# Patient Record
Sex: FEMALE | Race: WHITE | Employment: OTHER | URBAN - METROPOLITAN AREA
[De-identification: names, ages, dates, MRNs, and addresses within clinical notes are randomized per-mention and may not be internally consistent; named-entity substitution may affect disease eponyms.]

---

## 2017-03-30 ENCOUNTER — HOSPITAL ENCOUNTER (OUTPATIENT)
Dept: LAB | Age: 82
Discharge: HOME | End: 2017-03-30
Payer: COMMERCIAL

## 2017-03-30 LAB
HCT VFR BLD CALC: 35.7 % (ref 37–47)
HEMOGLOBIN: 11.7 G/DL (ref 12–16)
MCH RBC QN AUTO: 27 PG (ref 27–31.3)
MCHC RBC AUTO-ENTMCNC: 32.9 % (ref 33–37)
MCV RBC AUTO: 82.1 FL (ref 82–100)
PDW BLD-RTO: 16.4 % (ref 11.5–14.5)
PLATELET # BLD: 362 K/UL (ref 130–400)
RBC # BLD: 4.34 M/UL (ref 4.2–5.4)
TSH SERPL DL<=0.05 MIU/L-ACNC: 3.5 UIU/ML (ref 0.27–4.2)
WBC # BLD: 7.3 K/UL (ref 4.8–10.8)

## 2017-03-30 PROCEDURE — 36415 COLL VENOUS BLD VENIPUNCTURE: CPT

## 2017-03-30 PROCEDURE — 84443 ASSAY THYROID STIM HORMONE: CPT

## 2017-03-30 PROCEDURE — 87086 URINE CULTURE/COLONY COUNT: CPT

## 2017-03-30 PROCEDURE — 85027 COMPLETE CBC AUTOMATED: CPT

## 2017-04-01 LAB
ORGANISM: ABNORMAL
URINE CULTURE, ROUTINE: ABNORMAL

## 2017-05-02 ENCOUNTER — HOSPITAL ENCOUNTER (OUTPATIENT)
Age: 82
Setting detail: SPECIMEN
Discharge: HOME | End: 2017-05-02
Payer: COMMERCIAL

## 2017-05-02 PROCEDURE — 87077 CULTURE AEROBIC IDENTIFY: CPT

## 2017-05-02 PROCEDURE — 87086 URINE CULTURE/COLONY COUNT: CPT

## 2017-05-02 PROCEDURE — 87186 SC STD MICRODIL/AGAR DIL: CPT

## 2017-05-04 LAB
ORGANISM: ABNORMAL
URINE CULTURE, ROUTINE: ABNORMAL

## 2017-06-12 ENCOUNTER — HOSPITAL ENCOUNTER (EMERGENCY)
Age: 82
Discharge: HOME | End: 2017-06-12
Attending: EMERGENCY MEDICINE
Payer: COMMERCIAL

## 2017-06-12 VITALS
TEMPERATURE: 98.2 F | HEART RATE: 80 BPM | SYSTOLIC BLOOD PRESSURE: 151 MMHG | BODY MASS INDEX: 32.25 KG/M2 | DIASTOLIC BLOOD PRESSURE: 74 MMHG | WEIGHT: 160 LBS | OXYGEN SATURATION: 97 % | HEIGHT: 59 IN | RESPIRATION RATE: 12 BRPM

## 2017-06-12 DIAGNOSIS — R55 VASOVAGAL SYNCOPE: ICD-10-CM

## 2017-06-12 DIAGNOSIS — E86.0 DEHYDRATION, MODERATE: ICD-10-CM

## 2017-06-12 DIAGNOSIS — N39.0 ACUTE URINARY TRACT INFECTION: Primary | ICD-10-CM

## 2017-06-12 LAB
ANION GAP SERPL CALCULATED.3IONS-SCNC: 17 MEQ/L (ref 7–13)
BACTERIA: ABNORMAL /HPF
BILIRUBIN URINE: NEGATIVE
BLOOD, URINE: NORMAL
BUN BLDV-MCNC: 15 MG/DL (ref 8–23)
CALCIUM SERPL-MCNC: 10.7 MG/DL (ref 8.6–10.2)
CHLORIDE BLD-SCNC: 98 MEQ/L (ref 98–107)
CLARITY: CLEAR
CO2: 27 MEQ/L (ref 22–29)
COLOR: YELLOW
CREAT SERPL-MCNC: 0.65 MG/DL (ref 0.5–0.9)
GFR AFRICAN AMERICAN: >60
GFR NON-AFRICAN AMERICAN: >60
GLUCOSE BLD-MCNC: 152 MG/DL (ref 74–109)
GLUCOSE URINE: NEGATIVE MG/DL
HCT VFR BLD CALC: 35.6 % (ref 37–47)
HEMOGLOBIN: 11.8 G/DL (ref 12–16)
KETONES, URINE: NEGATIVE MG/DL
LEUKOCYTE ESTERASE, URINE: NORMAL
MCH RBC QN AUTO: 27.5 PG (ref 27–31.3)
MCHC RBC AUTO-ENTMCNC: 33.3 % (ref 33–37)
MCV RBC AUTO: 82.8 FL (ref 82–100)
NITRITE, URINE: NEGATIVE
PDW BLD-RTO: 17.1 % (ref 11.5–14.5)
PH UA: 7 (ref 5–9)
PLATELET # BLD: 360 K/UL (ref 130–400)
POTASSIUM SERPL-SCNC: 4.3 MEQ/L (ref 3.5–5.1)
PROTEIN UA: NEGATIVE MG/DL
RBC # BLD: 4.3 M/UL (ref 4.2–5.4)
RBC UA: ABNORMAL /HPF (ref 0–2)
SODIUM BLD-SCNC: 142 MEQ/L (ref 132–144)
SPECIFIC GRAVITY UA: 1.01 (ref 1–1.03)
TROPONIN: <0.01 NG/ML (ref 0–0.01)
URINE REFLEX TO CULTURE: YES
UROBILINOGEN, URINE: 0.2 E.U./DL
WBC # BLD: 8.3 K/UL (ref 4.8–10.8)
WBC UA: ABNORMAL /HPF (ref 0–5)

## 2017-06-12 PROCEDURE — 81001 URINALYSIS AUTO W/SCOPE: CPT

## 2017-06-12 PROCEDURE — 85027 COMPLETE CBC AUTOMATED: CPT

## 2017-06-12 PROCEDURE — 80048 BASIC METABOLIC PNL TOTAL CA: CPT

## 2017-06-12 PROCEDURE — 87186 SC STD MICRODIL/AGAR DIL: CPT

## 2017-06-12 PROCEDURE — 93005 ELECTROCARDIOGRAM TRACING: CPT

## 2017-06-12 PROCEDURE — 87086 URINE CULTURE/COLONY COUNT: CPT

## 2017-06-12 PROCEDURE — 99284 EMERGENCY DEPT VISIT MOD MDM: CPT

## 2017-06-12 PROCEDURE — 87077 CULTURE AEROBIC IDENTIFY: CPT

## 2017-06-12 PROCEDURE — 6360000002 HC RX W HCPCS: Performed by: EMERGENCY MEDICINE

## 2017-06-12 PROCEDURE — 96361 HYDRATE IV INFUSION ADD-ON: CPT

## 2017-06-12 PROCEDURE — 84484 ASSAY OF TROPONIN QUANT: CPT

## 2017-06-12 PROCEDURE — 2580000003 HC RX 258: Performed by: EMERGENCY MEDICINE

## 2017-06-12 PROCEDURE — 96374 THER/PROPH/DIAG INJ IV PUSH: CPT

## 2017-06-12 PROCEDURE — 36415 COLL VENOUS BLD VENIPUNCTURE: CPT

## 2017-06-12 RX ORDER — ONDANSETRON 2 MG/ML
4 INJECTION INTRAMUSCULAR; INTRAVENOUS ONCE
Status: COMPLETED | OUTPATIENT
Start: 2017-06-12 | End: 2017-06-12

## 2017-06-12 RX ORDER — ONDANSETRON 4 MG/1
4 TABLET, FILM COATED ORAL EVERY 8 HOURS PRN
Qty: 20 TABLET | Refills: 0 | Status: SHIPPED | OUTPATIENT
Start: 2017-06-12 | End: 2018-07-24 | Stop reason: ALTCHOICE

## 2017-06-12 RX ORDER — 0.9 % SODIUM CHLORIDE 0.9 %
500 INTRAVENOUS SOLUTION INTRAVENOUS ONCE
Status: COMPLETED | OUTPATIENT
Start: 2017-06-12 | End: 2017-06-12

## 2017-06-12 RX ORDER — SODIUM CHLORIDE 9 MG/ML
INJECTION, SOLUTION INTRAVENOUS CONTINUOUS
Status: DISCONTINUED | OUTPATIENT
Start: 2017-06-12 | End: 2017-06-12 | Stop reason: HOSPADM

## 2017-06-12 RX ORDER — CEPHALEXIN 500 MG/1
500 CAPSULE ORAL 4 TIMES DAILY
Qty: 28 CAPSULE | Refills: 0 | Status: SHIPPED | OUTPATIENT
Start: 2017-06-12 | End: 2018-07-24 | Stop reason: ALTCHOICE

## 2017-06-12 RX ADMIN — SODIUM CHLORIDE 500 ML: 900 INJECTION, SOLUTION INTRAVENOUS at 12:44

## 2017-06-12 RX ADMIN — SODIUM CHLORIDE: 900 INJECTION, SOLUTION INTRAVENOUS at 13:12

## 2017-06-12 RX ADMIN — ONDANSETRON 4 MG: 2 INJECTION INTRAMUSCULAR; INTRAVENOUS at 12:44

## 2017-06-12 ASSESSMENT — ENCOUNTER SYMPTOMS
RHINORRHEA: 0
EYE PAIN: 0
WHEEZING: 0
SORE THROAT: 0
DIARRHEA: 0
EYE DISCHARGE: 0
COLOR CHANGE: 0
ABDOMINAL PAIN: 0
CONSTIPATION: 0
COUGH: 0
VOICE CHANGE: 0
SHORTNESS OF BREATH: 0
VOMITING: 0
RECTAL PAIN: 0
ANAL BLEEDING: 0
APNEA: 0
SINUS PRESSURE: 0
EYE ITCHING: 0
STRIDOR: 0
CHOKING: 0
CHEST TIGHTNESS: 0
PHOTOPHOBIA: 0
TROUBLE SWALLOWING: 0
ABDOMINAL DISTENTION: 0
BACK PAIN: 0
NAUSEA: 0
BLOOD IN STOOL: 0
FACIAL SWELLING: 0
EYE REDNESS: 0

## 2017-06-14 LAB
ORGANISM: ABNORMAL
URINE CULTURE, ROUTINE: ABNORMAL

## 2017-06-22 LAB
EKG ATRIAL RATE: 80 BPM
EKG P AXIS: 21 DEGREES
EKG P-R INTERVAL: 168 MS
EKG Q-T INTERVAL: 384 MS
EKG QRS DURATION: 80 MS
EKG QTC CALCULATION (BAZETT): 442 MS
EKG R AXIS: -16 DEGREES
EKG T AXIS: 16 DEGREES
EKG VENTRICULAR RATE: 80 BPM

## 2017-07-14 ASSESSMENT — ENCOUNTER SYMPTOMS
SORE THROAT: 0
COUGH: 0
WHEEZING: 0
VOMITING: 0
EYE PAIN: 0
NAUSEA: 0
CHOKING: 0
EYE ITCHING: 0
EYE DISCHARGE: 0
APNEA: 0
RHINORRHEA: 0
COLOR CHANGE: 0
STRIDOR: 0
BLOOD IN STOOL: 0
SINUS PRESSURE: 0
ABDOMINAL DISTENTION: 0
SHORTNESS OF BREATH: 0
BACK PAIN: 0
CHEST TIGHTNESS: 0
ABDOMINAL PAIN: 0
EYE REDNESS: 0
PHOTOPHOBIA: 0
FACIAL SWELLING: 0
RECTAL PAIN: 0
TROUBLE SWALLOWING: 0
DIARRHEA: 0
ANAL BLEEDING: 0
VOICE CHANGE: 0
CONSTIPATION: 0

## 2017-11-20 ENCOUNTER — TELEPHONE (OUTPATIENT)
Dept: PHARMACY | Facility: CLINIC | Age: 82
End: 2017-11-20

## 2017-11-20 NOTE — TELEPHONE ENCOUNTER
CLINICAL PHARMACY: [facility] REVIEW    Identified care gap 90 day conversion per [employer]: Pravastatin 20mg    Reached patient to review. Patient is interested in changing to a 90-day supply at this time. Patient stated she was on her way to her doctors appointment and will let her doctor know to switch her medications to 90 day supply    Nancy SStephanie  [address]  Direct: [phone]  Department, toll free: [phone], option 7

## 2018-06-18 ENCOUNTER — HOSPITAL ENCOUNTER (OUTPATIENT)
Dept: LAB | Age: 83
Discharge: HOME | End: 2018-06-18
Payer: MEDICARE

## 2018-06-18 LAB
ANION GAP SERPL CALCULATED.3IONS-SCNC: 15 MEQ/L (ref 7–13)
BUN BLDV-MCNC: 26 MG/DL (ref 8–23)
CALCIUM SERPL-MCNC: 9 MG/DL (ref 8.6–10.2)
CHLORIDE BLD-SCNC: 101 MEQ/L (ref 98–107)
CO2: 26 MEQ/L (ref 22–29)
CREAT SERPL-MCNC: 0.72 MG/DL (ref 0.5–0.9)
GFR AFRICAN AMERICAN: >60
GFR NON-AFRICAN AMERICAN: >60
GLUCOSE BLD-MCNC: 106 MG/DL (ref 74–109)
POTASSIUM SERPL-SCNC: 3.8 MEQ/L (ref 3.5–5.1)
SODIUM BLD-SCNC: 142 MEQ/L (ref 132–144)

## 2018-06-18 PROCEDURE — 36415 COLL VENOUS BLD VENIPUNCTURE: CPT

## 2018-06-18 PROCEDURE — 80048 BASIC METABOLIC PNL TOTAL CA: CPT

## 2018-10-01 ENCOUNTER — HOSPITAL ENCOUNTER (OUTPATIENT)
Dept: LAB | Age: 83
Discharge: HOME | End: 2018-10-01
Payer: MEDICARE

## 2018-10-01 LAB
FERRITIN: 18.1 NG/ML (ref 13–150)
HCT VFR BLD CALC: 33.2 % (ref 37–47)
HEMOGLOBIN: 11 G/DL (ref 12–16)
IRON SATURATION: 10 % (ref 11–46)
IRON: 40 UG/DL (ref 37–145)
MCH RBC QN AUTO: 28.3 PG (ref 27–31.3)
MCHC RBC AUTO-ENTMCNC: 33.2 % (ref 33–37)
MCV RBC AUTO: 85.1 FL (ref 82–100)
PDW BLD-RTO: 16.6 % (ref 11.5–14.5)
PLATELET # BLD: 356 K/UL (ref 130–400)
RBC # BLD: 3.9 M/UL (ref 4.2–5.4)
TOTAL IRON BINDING CAPACITY: 385 UG/DL (ref 178–450)
VITAMIN B-12: 690 PG/ML (ref 232–1245)
WBC # BLD: 7.1 K/UL (ref 4.8–10.8)

## 2018-10-01 PROCEDURE — 83540 ASSAY OF IRON: CPT

## 2018-10-01 PROCEDURE — 85027 COMPLETE CBC AUTOMATED: CPT

## 2018-10-01 PROCEDURE — 82728 ASSAY OF FERRITIN: CPT

## 2018-10-01 PROCEDURE — 36415 COLL VENOUS BLD VENIPUNCTURE: CPT

## 2018-10-01 PROCEDURE — 83550 IRON BINDING TEST: CPT

## 2018-10-01 PROCEDURE — 82607 VITAMIN B-12: CPT

## 2019-05-15 ENCOUNTER — HOSPITAL ENCOUNTER (OUTPATIENT)
Dept: LAB | Age: 84
Discharge: HOME | End: 2019-05-15
Payer: MEDICARE

## 2019-05-15 LAB
ANION GAP SERPL CALCULATED.3IONS-SCNC: 16 MEQ/L (ref 9–15)
BUN BLDV-MCNC: 24 MG/DL (ref 8–23)
CALCIUM SERPL-MCNC: 10 MG/DL (ref 8.5–9.9)
CHLORIDE BLD-SCNC: 98 MEQ/L (ref 95–107)
CHOLESTEROL, TOTAL: 213 MG/DL (ref 0–199)
CO2: 25 MEQ/L (ref 20–31)
CREAT SERPL-MCNC: 0.74 MG/DL (ref 0.5–0.9)
GFR AFRICAN AMERICAN: >60
GFR NON-AFRICAN AMERICAN: >60
GLUCOSE BLD-MCNC: 128 MG/DL (ref 70–99)
HBA1C MFR BLD: 7.3 % (ref 4.8–5.9)
HDLC SERPL-MCNC: 57 MG/DL (ref 40–59)
LDL CHOLESTEROL CALCULATED: 112 MG/DL (ref 0–129)
POTASSIUM SERPL-SCNC: 4.1 MEQ/L (ref 3.4–4.9)
SODIUM BLD-SCNC: 139 MEQ/L (ref 135–144)
TRIGL SERPL-MCNC: 222 MG/DL (ref 0–150)

## 2019-05-15 PROCEDURE — 80048 BASIC METABOLIC PNL TOTAL CA: CPT

## 2019-05-15 PROCEDURE — 36415 COLL VENOUS BLD VENIPUNCTURE: CPT

## 2019-05-15 PROCEDURE — 80061 LIPID PANEL: CPT

## 2019-05-15 PROCEDURE — 83036 HEMOGLOBIN GLYCOSYLATED A1C: CPT

## 2019-06-04 ENCOUNTER — HOSPITAL ENCOUNTER (OUTPATIENT)
Age: 84
Setting detail: SPECIMEN
Discharge: HOME | End: 2019-06-04
Payer: MEDICARE

## 2019-06-04 PROCEDURE — 87186 SC STD MICRODIL/AGAR DIL: CPT

## 2019-06-04 PROCEDURE — 87086 URINE CULTURE/COLONY COUNT: CPT

## 2019-06-04 PROCEDURE — 87077 CULTURE AEROBIC IDENTIFY: CPT

## 2019-06-07 LAB
ORGANISM: ABNORMAL
URINE CULTURE, ROUTINE: ABNORMAL
URINE CULTURE, ROUTINE: ABNORMAL

## 2019-06-21 ENCOUNTER — HOSPITAL ENCOUNTER (OUTPATIENT)
Age: 84
Setting detail: SPECIMEN
Discharge: HOME | End: 2019-06-21
Payer: MEDICARE

## 2019-06-21 PROCEDURE — 87086 URINE CULTURE/COLONY COUNT: CPT

## 2019-06-23 LAB — URINE CULTURE, ROUTINE: NORMAL

## 2020-07-07 ENCOUNTER — OFFICE VISIT (OUTPATIENT)
Dept: OBGYN CLINIC | Age: 85
End: 2020-07-07
Payer: MEDICARE

## 2020-07-07 VITALS
SYSTOLIC BLOOD PRESSURE: 124 MMHG | BODY MASS INDEX: 30 KG/M2 | DIASTOLIC BLOOD PRESSURE: 80 MMHG | HEIGHT: 59 IN | WEIGHT: 148.8 LBS

## 2020-07-07 PROCEDURE — G8400 PT W/DXA NO RESULTS DOC: HCPCS | Performed by: OBSTETRICS & GYNECOLOGY

## 2020-07-07 PROCEDURE — G8427 DOCREV CUR MEDS BY ELIG CLIN: HCPCS | Performed by: OBSTETRICS & GYNECOLOGY

## 2020-07-07 PROCEDURE — 1036F TOBACCO NON-USER: CPT | Performed by: OBSTETRICS & GYNECOLOGY

## 2020-07-07 PROCEDURE — 1123F ACP DISCUSS/DSCN MKR DOCD: CPT | Performed by: OBSTETRICS & GYNECOLOGY

## 2020-07-07 PROCEDURE — 1090F PRES/ABSN URINE INCON ASSESS: CPT | Performed by: OBSTETRICS & GYNECOLOGY

## 2020-07-07 PROCEDURE — 99203 OFFICE O/P NEW LOW 30 MIN: CPT | Performed by: OBSTETRICS & GYNECOLOGY

## 2020-07-07 PROCEDURE — G8417 CALC BMI ABV UP PARAM F/U: HCPCS | Performed by: OBSTETRICS & GYNECOLOGY

## 2020-07-07 PROCEDURE — 4040F PNEUMOC VAC/ADMIN/RCVD: CPT | Performed by: OBSTETRICS & GYNECOLOGY

## 2020-07-22 ENCOUNTER — OFFICE VISIT (OUTPATIENT)
Dept: OBGYN CLINIC | Age: 85
End: 2020-07-22
Payer: MEDICARE

## 2020-07-22 VITALS
DIASTOLIC BLOOD PRESSURE: 76 MMHG | HEART RATE: 80 BPM | WEIGHT: 147 LBS | BODY MASS INDEX: 29.64 KG/M2 | HEIGHT: 59 IN | SYSTOLIC BLOOD PRESSURE: 138 MMHG

## 2020-07-22 PROCEDURE — G8417 CALC BMI ABV UP PARAM F/U: HCPCS | Performed by: OBSTETRICS & GYNECOLOGY

## 2020-07-22 PROCEDURE — 1090F PRES/ABSN URINE INCON ASSESS: CPT | Performed by: OBSTETRICS & GYNECOLOGY

## 2020-07-22 PROCEDURE — 4040F PNEUMOC VAC/ADMIN/RCVD: CPT | Performed by: OBSTETRICS & GYNECOLOGY

## 2020-07-22 PROCEDURE — 1123F ACP DISCUSS/DSCN MKR DOCD: CPT | Performed by: OBSTETRICS & GYNECOLOGY

## 2020-07-22 PROCEDURE — 1036F TOBACCO NON-USER: CPT | Performed by: OBSTETRICS & GYNECOLOGY

## 2020-07-22 PROCEDURE — G8400 PT W/DXA NO RESULTS DOC: HCPCS | Performed by: OBSTETRICS & GYNECOLOGY

## 2020-07-22 PROCEDURE — G8427 DOCREV CUR MEDS BY ELIG CLIN: HCPCS | Performed by: OBSTETRICS & GYNECOLOGY

## 2020-07-22 PROCEDURE — 99213 OFFICE O/P EST LOW 20 MIN: CPT | Performed by: OBSTETRICS & GYNECOLOGY

## 2020-07-22 ASSESSMENT — ENCOUNTER SYMPTOMS
SHORTNESS OF BREATH: 0
ABDOMINAL PAIN: 0
APNEA: 0
ABDOMINAL PAIN: 0
SHORTNESS OF BREATH: 0
APNEA: 0

## 2020-07-22 NOTE — PROGRESS NOTES
Subjective:      Patient ID:  Calvin Trejo is a 82 y.o. female with chief complaint of:  Chief Complaint   Patient presents with    Incontinence     pt referred by Dr. Narciso Hidalgo, pt has heavy urine leakage thast been going on for year. Patient with concern about frequent urination and urine loss in the last year. However with review of case she has had intermittent urinary tract infections that were culture positive.  No bleeding no pelvic pain no discharge      Past Medical History:   Diagnosis Date    [facility] [address]     Diabetes ([facility] [address]     GERD (gastroesophageal reflux disease)     Hyperlipidemia     Hypertension     Sleep difficulties      Past Surgical History:   Procedure Laterality Date    APPENDECTOMY      BREAST BIOPSY      TONSILLECTOMY AND ADENOIDECTOMY      WRIST SURGERY Right      Family History   Problem Relation Age of Onset    Cancer Mother         colon    Cancer Father         colon    Cancer Maternal Aunt         colon    Cancer Maternal Grandmother         colon     Current Outpatient Medications on File Prior to Visit   Medication Sig Dispense Refill    Mirabegron ER 50 MG TB24 Take 50 mg by mouth daily 30 tablet 1    ibuprofen (ADVIL;MOTRIN) 200 MG tablet Take 200 mg by mouth      MISC NATURAL PRODUCTS PO Take 10 mg by mouth       MISC NATURAL PRODUCTS PO Take 3 tablets by mouth       Melatonin 10 MG TABS Take 10 mg by mouth      Mirabegron ER (MYRBETRIQ) 50 MG TB24 Take 50 mg by mouth daily 21 tablet 0    nystatin (NYSTATIN) 983949 UNIT/GM powder APPLY 3 TIMES DAILY 1 Bottle 0    esomeprazole (NEXIUM) 40 MG capsule Take by mouth      Multiple Vitamins-Minerals (MULTIVITAMIN ADULT) TABS Take by mouth      traMADol (ULTRAM) 50 MG tablet Take 50 mg by mouth      amLODIPine-benazepril (LOTREL) 5-20 MG per capsule       bisoprolol (ZEBETA) 10 MG tablet       pravastatin (PRAVACHOL) 40 MG tablet       metFORMIN (GLUCOPHAGE) 500 MG tablet        No current

## 2020-07-22 NOTE — PROGRESS NOTES
Subjective:      Patient ID:  Charity Rivera is a 82 y.o. female with chief complaint of:  Chief Complaint   Patient presents with    Follow-up     2 week Myrbetriq. She states it worked much better the first couple of days but then it seemed to not work so well. Patient presents as follow up to starting the use of Myrbetriq.  patient states she is having some improvement  Kang wetting overnight but she still feels like she has occasional loss doing the day      Past Medical History:   Diagnosis Date    Diabetes ([address]     Diabetes ([address]     GERD (gastroesophageal reflux disease)     Hyperlipidemia     Hypertension     Sleep difficulties      Past Surgical History:   Procedure Laterality Date    APPENDECTOMY      BREAST BIOPSY      TONSILLECTOMY AND ADENOIDECTOMY      WRIST SURGERY Right      Family History   Problem Relation Age of Onset    Cancer Mother         colon    Cancer Father         colon    Cancer Maternal Aunt         colon    Cancer Maternal Grandmother         colon     Current Outpatient Medications on File Prior to Visit   Medication Sig Dispense Refill    mirabegron (MYRBETRIQ) 25 MG TB24 Take 1 tablet by mouth daily Lot [id number]  Exp 12/2021 21 tablet 0    Mirabegron ER 50 MG TB24 Take 50 mg by mouth daily 30 tablet 1    ibuprofen (ADVIL;MOTRIN) 200 MG tablet Take 200 mg by mouth      MISC NATURAL PRODUCTS PO Take 10 mg by mouth       MISC NATURAL PRODUCTS PO Take 3 tablets by mouth       Melatonin 10 MG TABS Take 10 mg by mouth      Mirabegron ER (MYRBETRIQ) 50 MG TB24 Take 50 mg by mouth daily 21 tablet 0    nystatin (NYSTATIN) [id number] UNIT/GM powder APPLY 3 TIMES DAILY 1 Bottle 0    esomeprazole (NEXIUM) 40 MG capsule Take by mouth      Multiple Vitamins-Minerals (MULTIVITAMIN ADULT) TABS Take by mouth      traMADol (ULTRAM) 50 MG tablet Take 50 mg by mouth      amLODIPine-benazepril (LOTREL) 5-20 MG per capsule       bisoprolol (ZEBETA) 10 MG tablet       pravastatin (PRAVACHOL) 40 MG tablet       metFORMIN (GLUCOPHAGE) 500 MG tablet        No current facility-administered medications on file prior to visit. Allergies:  Actos [pioglitazone] and Codeine    Review of Systems   Constitutional: Negative for fatigue and fever. Respiratory: Negative for apnea and shortness of breath. Cardiovascular: Negative for chest pain and palpitations. Gastrointestinal: Negative for abdominal pain. Genitourinary: Positive for difficulty urinating. Negative for dysuria, pelvic pain, vaginal bleeding and vaginal discharge. Neurological: Negative for dizziness, weakness and light-headedness. Psychiatric/Behavioral: Negative for agitation and dysphoric mood. Objective:   /76 (Site: Right Upper Arm, Position: Sitting, Cuff Size: Medium Adult)   Pulse 80   Ht 4' 11\" (1.499 m)   Wt 147 lb (66.7 kg)   BMI 29.69 kg/m²      Physical Exam  Constitutional:       Appearance: Normal appearance. Skin:     General: Skin is warm and dry. Neurological:      Mental Status: She is alert. Psychiatric:         Mood and Affect: Mood normal.         Behavior: Behavior normal.         Assessment:       Diagnosis Orders   1. OAB (overactive bladder)           Plan:      No orders of the defined types were placed in this encounter. No orders of the defined types were placed in this encounter. Return if symptoms worsen or fail to improve.      Fabian Kern,

## 2020-07-24 DIAGNOSIS — R35.0 URINARY FREQUENCY: ICD-10-CM

## 2020-07-24 DIAGNOSIS — N32.81 OAB (OVERACTIVE BLADDER): ICD-10-CM

## 2020-07-24 LAB
BILIRUBIN URINE: NEGATIVE
BLOOD, URINE: NEGATIVE
CLARITY: CLEAR
COLOR: YELLOW
GLUCOSE URINE: NEGATIVE MG/DL
KETONES, URINE: NEGATIVE MG/DL
LEUKOCYTE ESTERASE, URINE: ABNORMAL
NITRITE, URINE: POSITIVE
PH UA: 7.5 (ref 5–9)
PROTEIN UA: NEGATIVE MG/DL
SPECIFIC GRAVITY UA: 1.01 (ref 1–1.03)
UROBILINOGEN, URINE: 0.2 E.U./DL

## 2020-07-25 LAB
BACTERIA: ABNORMAL /HPF
EPITHELIAL CELLS, UA: ABNORMAL /HPF (ref 0–5)
HYALINE CASTS: ABNORMAL /HPF (ref 0–5)
RBC UA: ABNORMAL /HPF (ref 0–5)
WBC UA: ABNORMAL /HPF (ref 0–5)

## 2020-07-25 RX ORDER — CEPHALEXIN 500 MG/1
500 CAPSULE ORAL 2 TIMES DAILY
Qty: 14 CAPSULE | Refills: 0 | Status: SHIPPED | OUTPATIENT
Start: 2020-07-25 | End: 2020-08-01

## 2020-07-27 LAB
ORGANISM: ABNORMAL
URINE CULTURE, ROUTINE: ABNORMAL

## 2020-08-21 ENCOUNTER — TELEPHONE (OUTPATIENT)
Dept: OBGYN CLINIC | Age: 85
End: 2020-08-21

## 2020-08-21 NOTE — TELEPHONE ENCOUNTER
Pt calling stating that myrbetriq is no longer working, she is having heavy leakage and needs to know what else can be done.

## 2020-08-23 NOTE — TELEPHONE ENCOUNTER
If it is all of a sudden not working then you may have infection  Please have urine tested with us or pcp

## 2020-08-25 DIAGNOSIS — R35.0 URINARY FREQUENCY: ICD-10-CM

## 2020-08-25 LAB
BACTERIA: NEGATIVE /HPF
BILIRUBIN URINE: NEGATIVE
BLOOD, URINE: NEGATIVE
CLARITY: CLEAR
COLOR: YELLOW
EPITHELIAL CELLS, UA: NORMAL /HPF (ref 0–5)
GLUCOSE URINE: NEGATIVE MG/DL
HYALINE CASTS: NORMAL /HPF (ref 0–5)
KETONES, URINE: NEGATIVE MG/DL
LEUKOCYTE ESTERASE, URINE: ABNORMAL
NITRITE, URINE: POSITIVE
PH UA: 6.5 (ref 5–9)
PROTEIN UA: NEGATIVE MG/DL
RBC UA: NORMAL /HPF (ref 0–5)
SPECIFIC GRAVITY UA: 1.01 (ref 1–1.03)
UROBILINOGEN, URINE: 0.2 E.U./DL
WBC UA: NORMAL /HPF (ref 0–5)

## 2020-08-25 RX ORDER — CIPROFLOXACIN 500 MG/1
500 TABLET, FILM COATED ORAL 2 TIMES DAILY
Qty: 20 TABLET | Refills: 0 | Status: SHIPPED | OUTPATIENT
Start: 2020-08-25 | End: 2020-09-04

## 2020-08-28 LAB
ORGANISM: ABNORMAL
URINE CULTURE, ROUTINE: ABNORMAL

## 2020-09-08 ENCOUNTER — OFFICE VISIT (OUTPATIENT)
Dept: OBGYN CLINIC | Age: 85
End: 2020-09-08
Payer: MEDICARE

## 2020-09-08 VITALS
HEIGHT: 59 IN | DIASTOLIC BLOOD PRESSURE: 70 MMHG | BODY MASS INDEX: 29.6 KG/M2 | WEIGHT: 146.8 LBS | SYSTOLIC BLOOD PRESSURE: 144 MMHG

## 2020-09-08 PROCEDURE — 1090F PRES/ABSN URINE INCON ASSESS: CPT | Performed by: OBSTETRICS & GYNECOLOGY

## 2020-09-08 PROCEDURE — G8417 CALC BMI ABV UP PARAM F/U: HCPCS | Performed by: OBSTETRICS & GYNECOLOGY

## 2020-09-08 PROCEDURE — 99213 OFFICE O/P EST LOW 20 MIN: CPT | Performed by: OBSTETRICS & GYNECOLOGY

## 2020-09-08 PROCEDURE — G8400 PT W/DXA NO RESULTS DOC: HCPCS | Performed by: OBSTETRICS & GYNECOLOGY

## 2020-09-08 PROCEDURE — 1123F ACP DISCUSS/DSCN MKR DOCD: CPT | Performed by: OBSTETRICS & GYNECOLOGY

## 2020-09-08 PROCEDURE — 4040F PNEUMOC VAC/ADMIN/RCVD: CPT | Performed by: OBSTETRICS & GYNECOLOGY

## 2020-09-08 PROCEDURE — 1036F TOBACCO NON-USER: CPT | Performed by: OBSTETRICS & GYNECOLOGY

## 2020-09-08 PROCEDURE — G8427 DOCREV CUR MEDS BY ELIG CLIN: HCPCS | Performed by: OBSTETRICS & GYNECOLOGY

## 2020-09-08 ASSESSMENT — ENCOUNTER SYMPTOMS
SHORTNESS OF BREATH: 0
APNEA: 0

## 2020-09-08 NOTE — PROGRESS NOTES
Subjective:      Patient ID:  Harry Connell is a 82 y.o. female with chief complaint of:  Chief Complaint   Patient presents with    Urinary Frequency     pt here tod c/o UTI symptoms, urinary frequency, leakage, pt states that she had a UTI before but she feels it never went away       Patient presents today with concerns about possible UTI. Patient has problem with urge incontinence and overflow which initially treated with InterStim x2 and has failed since then. Patient was placed on Myrbetriq and she had some improvement but there was swelling noted with the use of medication so it was discontinued. Patient desires to move away from daily pad usage.       Past Medical History:   Diagnosis Date    [facility] [address]     Diabetes ([facility] [address]     GERD (gastroesophageal reflux disease)     Hyperlipidemia     Hypertension     Sleep difficulties      Past Surgical History:   Procedure Laterality Date    APPENDECTOMY      BREAST BIOPSY      TONSILLECTOMY AND ADENOIDECTOMY      WRIST SURGERY Right      Family History   Problem Relation Age of Onset    Cancer Mother         colon    Cancer Father         colon    Cancer Maternal Aunt         colon    Cancer Maternal Grandmother         colon     Current Outpatient Medications on File Prior to Visit   Medication Sig Dispense Refill    mirabegron (MYRBETRIQ) 25 MG TB24 Take 1 tablet by mouth daily 30 tablet 5    Mirabegron ER 50 MG TB24 Take 50 mg by mouth daily 30 tablet 1    ibuprofen (ADVIL;MOTRIN) 200 MG tablet Take 200 mg by mouth      MISC NATURAL PRODUCTS PO Take 10 mg by mouth       MISC NATURAL PRODUCTS PO Take 3 tablets by mouth       Melatonin 10 MG TABS Take 10 mg by mouth      Mirabegron ER (MYRBETRIQ) 50 MG TB24 Take 50 mg by mouth daily 21 tablet 0    nystatin (NYSTATIN) 116676 UNIT/GM powder APPLY 3 TIMES DAILY 1 Bottle 0    esomeprazole (NEXIUM) 40 MG capsule Take by mouth      Multiple Vitamins-Minerals (MULTIVITAMIN ADULT) TABS Take by mouth      traMADol (ULTRAM) 50 MG tablet Take 50 mg by mouth      amLODIPine-benazepril (LOTREL) 5-20 MG per capsule       bisoprolol (ZEBETA) 10 MG tablet       pravastatin (PRAVACHOL) 40 MG tablet       metFORMIN (GLUCOPHAGE) 500 MG tablet        No current facility-administered medications on file prior to visit. Allergies:  Actos [pioglitazone] and Codeine    Review of Systems   Constitutional: Negative for fatigue and fever. Respiratory: Negative for apnea and shortness of breath. Cardiovascular: Negative for chest pain and palpitations. Genitourinary: Positive for frequency (With urinary loss large amounts) and urgency. Negative for difficulty urinating, dysuria, pelvic pain, vaginal bleeding and vaginal discharge. Neurological: Negative for dizziness, weakness and light-headedness. Psychiatric/Behavioral: Negative for agitation and dysphoric mood. Objective:   BP (!) 144/70   Ht 4' 11\" (1.499 m)   Wt 146 lb 12.8 oz (66.6 kg)   BMI 29.65 kg/m²      Physical Exam    Assessment:       Diagnosis Orders   1. Urinary frequency  Culture, Urine    Urinalysis         Plan:      Orders Placed This Encounter   Procedures    Culture, Urine     Standing Status:   Future     Standing Expiration Date:   9/8/2021     Order Specific Question:   Specify (ex-cath, midstream, cysto, etc)? Answer:   midstream    Urinalysis     Standing Status:   Future     Standing Expiration Date:   9/8/2021     No orders of the defined types were placed in this encounter. Patient will most likely benefit from some uro-GYN intervention will have patient see Dr. Luis Enrique Doyle for further therapy. I discussed the removal her InterStim as well as the use of Botox. No follow-ups on file.      Jean-Claude Diaz DO

## 2020-09-09 ENCOUNTER — TELEPHONE (OUTPATIENT)
Dept: OBGYN CLINIC | Age: 85
End: 2020-09-09

## 2020-09-14 ENCOUNTER — INITIAL CONSULT (OUTPATIENT)
Dept: PODIATRY | Facility: CLINIC | Age: 85
End: 2020-09-14

## 2020-09-14 VITALS — BODY MASS INDEX: 30.02 KG/M2 | WEIGHT: 143 LBS | HEIGHT: 58 IN | TEMPERATURE: 98 F

## 2020-09-14 ASSESSMENT — ENCOUNTER SYMPTOMS
CONSTIPATION: 0
NAUSEA: 0
DIARRHEA: 0
SHORTNESS OF BREATH: 0
BACK PAIN: 0

## 2020-09-14 NOTE — PROGRESS NOTES
Rachel Vail  ([birth year])    9/14/20    Subjective     Rachel Vail is 82 y.o. female who complains today of:    Chief Complaint   Patient presents with    Diabetes    Nail Problem     SARAH    Toe Pain       HPI: Rachel Vail is seen in consultation at the request of Dr. Anne Beach for evaluation of right second toe deformity, diabetes. Patient presents with complaint of painful digital region of the right second toe. Patient states that she injured the toe during a fall several years ago. He healed in a deformed position which cause the knuckle of the toe to rub against the top of the shoe, she developed a slow healing wound that did not close until she had undergone vascular intervention by Dr. Anne Beach. Patient reports a history of diabetes. She last checked her blood glucose a few days ago, it measured 107 mg/dL. Patient states her most recent hemoglobin A1c was 7.3%. Patient denies any other diabetic ulcerations in the past.  Patient does not currently wear diabetic shoes, she is not abdomen several years. Patient also complains of fungal toenails, she is unable to provide self-care due to thickness and deformity of the toenails. Review of Systems   Constitutional: Negative for fever. HENT: Negative for hearing loss. Respiratory: Negative for shortness of breath. Gastrointestinal: Negative for constipation, diarrhea and nausea. Genitourinary: Negative for difficulty urinating. Musculoskeletal: Negative for back pain and neck pain. Skin: Positive for rash. Neurological: Negative for headaches. Hematological: Does not bruise/bleed easily. Psychiatric/Behavioral: Negative for sleep disturbance. She has not tried physical therapy. Date of last of last PT treatment: none    The patient is a diabetic.    PCP/ Endocrinologist: Dr Mele Al    Date last seen: 03/04/2020    Allergies:  Actos [pioglitazone] and Codeine    Current Outpatient Medications on File Prior to Visit   Medication Sig Dispense Refill    mirabegron (MYRBETRIQ) 25 MG TB24 Take 1 tablet by mouth daily 30 tablet 5    Mirabegron ER 50 MG TB24 Take 50 mg by mouth daily 30 tablet 1    ibuprofen (ADVIL;MOTRIN) 200 MG tablet Take 200 mg by mouth      MISC NATURAL PRODUCTS PO Take 10 mg by mouth       MISC NATURAL PRODUCTS PO Take 3 tablets by mouth       Melatonin 10 MG TABS Take 10 mg by mouth      Mirabegron ER (MYRBETRIQ) 50 MG TB24 Take 50 mg by mouth daily 21 tablet 0    nystatin (NYSTATIN) 241084 UNIT/GM powder APPLY 3 TIMES DAILY 1 Bottle 0    esomeprazole (NEXIUM) 40 MG capsule Take by mouth      Multiple Vitamins-Minerals (MULTIVITAMIN ADULT) TABS Take by mouth      traMADol (ULTRAM) 50 MG tablet Take 50 mg by mouth      amLODIPine-benazepril (LOTREL) 5-20 MG per capsule       bisoprolol (ZEBETA) 10 MG tablet       pravastatin (PRAVACHOL) 40 MG tablet       metFORMIN (GLUCOPHAGE) 500 MG tablet        No current facility-administered medications on file prior to visit. Past Medical History:   Diagnosis Date    Diabetes [facility] [address]     Diabetes ([facility] [address]     GERD (gastroesophageal reflux disease)     Hyperlipidemia     Hypertension     Sleep difficulties      Past Surgical History:   Procedure Laterality Date    APPENDECTOMY      BREAST BIOPSY      TONSILLECTOMY AND ADENOIDECTOMY      WRIST SURGERY Right      Social History     Socioeconomic History    Marital status:       Spouse name: Not on file    Number of children: Not on file    Years of education: Not on file    Highest education level: Not on file   Occupational History    Not on file   Social Needs    Financial resource strain: Not on file    Food insecurity     Worry: Not on file     Inability: Not on file    Transportation needs     Medical: Not on file     Non-medical: Not on file   Tobacco Use    Smoking status: Former Smoker    Smokeless tobacco: Never Used   Substance and Sexual Activity    Alcohol use: Yes     Comment: occ    Drug use: No    Sexual activity: Not Currently   Lifestyle    Physical activity     Days per week: Not on file     Minutes per session: Not on file    Stress: Not on file   Relationships    Social connections     Talks on phone: Not on file     Gets together: Not on file     Attends [employer] service: Not on file     Active member of club or organization: Not on file     Attends meetings of clubs or organizations: Not on file     Relationship status: Not on file    Intimate partner violence     Fear of current or ex partner: Not on file     Emotionally abused: Not on file     Physically abused: Not on file     Forced sexual activity: Not on file   Other Topics Concern    Not on file   Social History Narrative    Not on file     Family History   Problem Relation Age of Onset    Cancer Mother         colon    Cancer Father         colon    Cancer Maternal Aunt         colon    Cancer Maternal Grandmother         colon           Objective:   Vitals:  Temp 98 °F (36.7 °C)   Ht 4' 10\" (1.473 m)   Wt 143 lb (64.9 kg)   BMI 29.89 kg/m² Pain Score:   4    Physical Exam  Constitutional:     Well nourished and well developed. Appears neat and clean. Patient is alert, oriented x3, and in no apparent distress. Vascular:   Dorsalis pedis pulse is nonpalpable bilateral foot. Posterior tibial pulse is nonpalpable bilateral foot. There is no edema noted to the bilateral lower extremity. Capillary refill is delayed bilateral hallux. There are mild varicosities noted bilaterally. There are no telangiectasia noted bilaterally. Skin temperature gradient is noted to be warm to cool bilaterally. There are no signs of ischemia or skin atrophy noted bilaterally. Hair growth is absent bilaterally. Neurological:   Protective sensation is intact bilaterally. Light touch sensation is altered to the left foot toes. Vibratory sensation is diminished bilaterally.   Hot versus cold discrimination is intact bilaterally. Sharp versus dull discrimination is intact bilaterally. Patellar deep tendon reflex is graded at 0/4 bilaterally. Achilles tendon deep tendon reflex is graded at 0/4 bilaterally. The Babinski response is negative bilaterally. No ankle clonus is noted bilaterally. Dermatological:  No open lesions noted bilateral foot. The toenails are mycotic and are elongated bilaterally. The nail plates are yellow, thickened, are incurvated, and there is subungual debris. Normal skin texture noted bilaterally. Focal hyperkeratotic lesions noted: right first and second MPJ, bilateral fifth MPJ, bilateral hallux. Normal skin turgor noted bilaterally. Webspace 1-4 is dry and intact bilateral foot. Musculoskeletal:  Rectus foot type noted bilaterally. Manual muscle strength is graded at 5/5 for all groups tested bilateral foot. Gross static deformities noted: rigid flexion deformity noted at the right second PIPJ. Semirigid hammertoe deformity noted left second toe PIPJ. Adductovarus fourth and fifth digit deformity noted bilaterally. Pain or crepitus noted with active or passive range of motion of the joints of the foot or ankle: none. There is tenderness palpation of the right second toe at the PIPJ and the MPJ. Decreased ankle joint dorsiflexion noted bilateral lower extremity. Psychiatric:    Judgement and insight intact. Short and long term memory intact. No evidence of depression, anxiety, or agitation. Patient is calm, cooperative, and communicative. Appropriate interactions and affect. Radiographs:                    Assessment:      Diagnosis Orders   1. Toe pain, right  XR FOOT RIGHT (MIN 3 VIEWS)    [place] DEBRIDEMENT OF NAILS, 6 OR MORE   2. Closed fracture of phalanx of right second toe, sequela     3. Subluxation of metatarsophalangeal joint of left lesser toe(s), sequela     4.  Diabetes mellitus type 2 with peripheral artery disease (HCC)  [place]

## 2020-09-15 DIAGNOSIS — M79.674 TOE PAIN, RIGHT: ICD-10-CM

## 2020-09-18 ENCOUNTER — HOSPITAL ENCOUNTER (OUTPATIENT)
Dept: PREADMISSION TESTING | Age: 85
Discharge: HOME | End: 2020-09-22
Payer: MEDICARE

## 2020-09-18 ENCOUNTER — NURSE ONLY (OUTPATIENT)
Dept: PRIMARY CARE CLINIC | Age: 85
End: 2020-09-18

## 2020-09-18 VITALS
RESPIRATION RATE: 16 BRPM | WEIGHT: 143 LBS | HEIGHT: 58 IN | SYSTOLIC BLOOD PRESSURE: 154 MMHG | TEMPERATURE: 97.6 F | HEART RATE: 78 BPM | BODY MASS INDEX: 30.02 KG/M2 | OXYGEN SATURATION: 98 % | DIASTOLIC BLOOD PRESSURE: 80 MMHG

## 2020-09-18 LAB
ANION GAP SERPL CALCULATED.3IONS-SCNC: 9 MEQ/L (ref 9–15)
BUN BLDV-MCNC: 16 MG/DL (ref 8–23)
CALCIUM SERPL-MCNC: 9.2 MG/DL (ref 8.5–9.9)
CHLORIDE BLD-SCNC: 100 MEQ/L (ref 95–107)
CO2: 27 MEQ/L (ref 20–31)
CREAT SERPL-MCNC: 0.79 MG/DL (ref 0.5–0.9)
EKG ATRIAL RATE: 74 BPM
EKG P AXIS: 20 DEGREES
EKG P-R INTERVAL: 174 MS
EKG Q-T INTERVAL: 398 MS
EKG QRS DURATION: 82 MS
EKG QTC CALCULATION (BAZETT): 441 MS
EKG R AXIS: -12 DEGREES
EKG T AXIS: 15 DEGREES
EKG VENTRICULAR RATE: 74 BPM
GFR AFRICAN AMERICAN: >60
GFR NON-AFRICAN AMERICAN: >60
GLUCOSE BLD-MCNC: 95 MG/DL (ref 70–99)
HCT VFR BLD CALC: 27.6 % (ref 37–47)
HEMOGLOBIN: 8.8 G/DL (ref 12–16)
MCH RBC QN AUTO: 24.5 PG (ref 27–31.3)
MCHC RBC AUTO-ENTMCNC: 31.9 % (ref 33–37)
MCV RBC AUTO: 76.8 FL (ref 82–100)
PDW BLD-RTO: 16.8 % (ref 11.5–14.5)
PLATELET # BLD: 361 K/UL (ref 130–400)
POTASSIUM SERPL-SCNC: 3.6 MEQ/L (ref 3.4–4.9)
RBC # BLD: 3.59 M/UL (ref 4.2–5.4)
SODIUM BLD-SCNC: 136 MEQ/L (ref 135–144)
WBC # BLD: 5.4 K/UL (ref 4.8–10.8)

## 2020-09-18 PROCEDURE — 80048 BASIC METABOLIC PNL TOTAL CA: CPT

## 2020-09-18 PROCEDURE — 93005 ELECTROCARDIOGRAM TRACING: CPT | Performed by: OBSTETRICS & GYNECOLOGY

## 2020-09-18 PROCEDURE — U0003 INFECTIOUS AGENT DETECTION BY NUCLEIC ACID (DNA OR RNA); SEVERE ACUTE RESPIRATORY SYNDROME CORONAVIRUS 2 (SARS-COV-2) (CORONAVIRUS DISEASE [COVID-19]), AMPLIFIED PROBE TECHNIQUE, MAKING USE OF HIGH THROUGHPUT TECHNOLOGIES AS DESCRIBED BY CMS-2020-01-R: HCPCS

## 2020-09-18 PROCEDURE — 85027 COMPLETE CBC AUTOMATED: CPT

## 2020-09-18 RX ORDER — TRAZODONE HYDROCHLORIDE 50 MG/1
TABLET ORAL
Status: ON HOLD | COMMUNITY
Start: 2020-08-18 | End: 2020-09-24 | Stop reason: HOSPADM

## 2020-09-18 RX ORDER — SODIUM CHLORIDE, SODIUM LACTATE, POTASSIUM CHLORIDE, CALCIUM CHLORIDE 600; 310; 30; 20 MG/100ML; MG/100ML; MG/100ML; MG/100ML
INJECTION, SOLUTION INTRAVENOUS CONTINUOUS
Status: CANCELLED | OUTPATIENT
Start: 2020-09-24

## 2020-09-18 RX ORDER — THIAMINE MONONITRATE (VIT B1) 100 MG
100 TABLET ORAL DAILY
Status: ON HOLD | COMMUNITY
End: 2020-09-24 | Stop reason: HOSPADM

## 2020-09-18 RX ORDER — DIPHENHYDRAMINE HCL 50 MG
CAPSULE ORAL
Status: ON HOLD | COMMUNITY
End: 2020-09-24 | Stop reason: HOSPADM

## 2020-09-18 RX ORDER — MULTIVIT-MIN/IRON/FOLIC ACID/K 18-600-40
CAPSULE ORAL
Status: ON HOLD | COMMUNITY
End: 2020-09-24 | Stop reason: HOSPADM

## 2020-09-18 RX ORDER — ASPIRIN 81 MG/1
81 TABLET, CHEWABLE ORAL DAILY
Status: ON HOLD | COMMUNITY
End: 2020-09-24 | Stop reason: HOSPADM

## 2020-09-18 RX ORDER — SODIUM CHLORIDE 0.9 % (FLUSH) 0.9 %
10 SYRINGE (ML) INJECTION EVERY 12 HOURS SCHEDULED
Status: CANCELLED | OUTPATIENT
Start: 2020-09-24

## 2020-09-18 RX ORDER — GABAPENTIN 300 MG/1
CAPSULE ORAL
Status: ON HOLD | COMMUNITY
Start: 2020-07-30 | End: 2020-09-24 | Stop reason: HOSPADM

## 2020-09-18 RX ORDER — LIDOCAINE HYDROCHLORIDE 10 MG/ML
1 INJECTION, SOLUTION EPIDURAL; INFILTRATION; INTRACAUDAL; PERINEURAL
Status: CANCELLED | OUTPATIENT
Start: 2020-09-24 | End: 2020-09-24

## 2020-09-18 RX ORDER — SODIUM CHLORIDE 0.9 % (FLUSH) 0.9 %
10 SYRINGE (ML) INJECTION PRN
Status: CANCELLED | OUTPATIENT
Start: 2020-09-24

## 2020-09-18 RX ORDER — CALCIUM CARBONATE 500(1250)
500 TABLET ORAL DAILY
Status: ON HOLD | COMMUNITY
End: 2020-09-24 | Stop reason: HOSPADM

## 2020-09-20 LAB
SARS-COV-2: NOT DETECTED
SOURCE: NORMAL

## 2020-09-20 PROCEDURE — 93010 ELECTROCARDIOGRAM REPORT: CPT | Performed by: INTERNAL MEDICINE

## 2020-09-23 ENCOUNTER — TELEPHONE (OUTPATIENT)
Dept: OBGYN CLINIC | Age: 85
End: 2020-09-23

## 2020-09-23 ENCOUNTER — ANESTHESIA EVENT (OUTPATIENT)
Dept: OPERATING ROOM | Age: 85
End: 2020-09-23
Payer: MEDICARE

## 2020-09-23 NOTE — ANESTHESIA PRE PROCEDURE
Department of Anesthesiology  Preprocedure Note       Name:  Jakub Romo   Age:  82 y.o.  :  1935                                          MRN:  [id number]         Date:  2020      Surgeon: Cherise Plata):  Tirso Hassan MD    Procedure: Procedure(s):  INTERSTIM REVISION  WITH BOTOX BLADDER INJECTIONS (100 UNITS)    Medications prior to admission:   Prior to Admission medications    Medication Sig Start Date End Date Taking?  Authorizing Provider   gabapentin (NEURONTIN) 300 MG capsule TAKE 1 CAPSULE BY MOUTH THREE TIMES DAILY 20   Historical Provider, MD   traZODone (DESYREL) 50 MG tablet  20   Historical Provider, MD   aspirin 81 MG chewable tablet Take 81 mg by mouth daily    Historical Provider, MD   calcium carbonate (OSCAL) 500 MG TABS tablet Take 500 mg by mouth daily    Historical Provider, MD   Cholecalciferol (VITAMIN D) 50 MCG (2000) CAPS capsule Take by mouth    Historical Provider, MD   vitamin B-1 (THIAMINE) 100 MG tablet Take 100 mg by mouth daily    Historical Provider, MD   diphenhydrAMINE HCl, Sleep, (UNISOM SLEEPGELS) 50 MG CAPS Take by mouth    Historical Provider, MD   ibuprofen (ADVIL;MOTRIN) 200 MG tablet Take 200 mg by mouth    Historical Provider, MD   MISC NATURAL PRODUCTS PO Take 10 mg by mouth     Historical Provider, MD   MISC NATURAL PRODUCTS PO Take 3 tablets by mouth     Historical Provider, MD   Melatonin 10 MG TABS Take 10 mg by mouth    Historical Provider, MD   nystatin (NYSTATIN) 648401 UNIT/GM powder APPLY 3 TIMES DAILY 18   [facility], MD   esomeprazole (NEXIUM) 40 MG capsule Take by mouth 08   Historical Provider, MD   Multiple Vitamins-Minerals (MULTIVITAMIN ADULT) TABS Take by mouth 08   Historical Provider, MD   traMADol (ULTRAM) 50 MG tablet Take 50 mg by mouth    Historical Provider, MD   amLODIPine-benazepril (LOTREL) 5-20 MG per capsule  13   Historical Provider, MD   bisoprolol (ZEBETA) 10 MG tablet  14   Historical Provider, MD   pravastatin (PRAVACHOL) 40 MG tablet  3/14/14   Historical Provider, MD   metFORMIN (GLUCOPHAGE) 500 MG tablet  2/25/14   Historical Provider, MD       Current medications:    No current facility-administered medications for this encounter. Current Outpatient Medications   Medication Sig Dispense Refill    gabapentin (NEURONTIN) 300 MG capsule TAKE 1 CAPSULE BY MOUTH THREE TIMES DAILY      traZODone (DESYREL) 50 MG tablet       aspirin 81 MG chewable tablet Take 81 mg by mouth daily      calcium carbonate (OSCAL) 500 MG TABS tablet Take 500 mg by mouth daily      Cholecalciferol (VITAMIN D) 50 MCG (2000 UT) CAPS capsule Take by mouth      vitamin B-1 (THIAMINE) 100 MG tablet Take 100 mg by mouth daily      diphenhydrAMINE HCl, Sleep, (UNISOM SLEEPGELS) 50 MG CAPS Take by mouth      ibuprofen (ADVIL;MOTRIN) 200 MG tablet Take 200 mg by mouth      MISC NATURAL PRODUCTS PO Take 10 mg by mouth       MISC NATURAL PRODUCTS PO Take 3 tablets by mouth       Melatonin 10 MG TABS Take 10 mg by mouth      nystatin (NYSTATIN) 510786 UNIT/GM powder APPLY 3 TIMES DAILY 1 Bottle 0    esomeprazole (NEXIUM) 40 MG capsule Take by mouth      Multiple Vitamins-Minerals (MULTIVITAMIN ADULT) TABS Take by mouth      traMADol (ULTRAM) 50 MG tablet Take 50 mg by mouth      amLODIPine-benazepril (LOTREL) 5-20 MG per capsule       bisoprolol (ZEBETA) 10 MG tablet       pravastatin (PRAVACHOL) 40 MG tablet       metFORMIN (GLUCOPHAGE) 500 MG tablet          Allergies: Allergies   Allergen Reactions    Actos [Pioglitazone] Diarrhea    Codeine Other (See Comments)     hallucinations       Problem List:  There is no problem list on file for this patient.       Past Medical History:        Diagnosis Date    Diabetes [facility] [address]     Diabetes ([facility] [address]     GERD (gastroesophageal reflux disease)     Hyperlipidemia     Hypertension     Sleep difficulties        Past Surgical History:        Procedure Laterality Date    APPENDECTOMY      BREAST BIOPSY      TONSILLECTOMY AND ADENOIDECTOMY      WRIST SURGERY Right        Social History:    Social History     Tobacco Use    Smoking status: Former Smoker    Smokeless tobacco: Never Used   Substance Use Topics    Alcohol use: Yes     Comment: occ                                Counseling given: Not Answered      Vital Signs (Current): There were no vitals filed for this visit. BP Readings from Last 3 Encounters:   09/18/20 (!) 154/80   09/08/20 (!) 144/70   07/22/20 138/76       NPO Status:                                                                                 BMI:   Wt Readings from Last 3 Encounters:   09/18/20 143 lb (64.9 kg)   09/14/20 143 lb (64.9 kg)   09/08/20 146 lb 12.8 oz (66.6 kg)     There is no height or weight on file to calculate BMI.    CBC:   Lab Results   Component Value Date    WBC 5.4 09/18/2020    RBC 3.59 09/18/2020    RBC 3.57 03/20/2012    HGB 8.8 09/18/2020    HCT 27.6 09/18/2020    MCV 76.8 09/18/2020    RDW 16.8 09/18/2020     09/18/2020       CMP:   Lab Results   Component Value Date     09/18/2020    K 3.6 09/18/2020     09/18/2020    CO2 27 09/18/2020    BUN 16 09/18/2020    CREATININE 0.79 09/18/2020    GFRAA >60.0 09/18/2020    LABGLOM >60.0 09/18/2020    GLUCOSE 95 09/18/2020    GLUCOSE 143 03/20/2012    PROT 6.6 02/22/2013    CALCIUM 9.2 09/18/2020    ALKPHOS 60 02/22/2013    AST 22 02/22/2013    ALT 19 02/22/2013       POC Tests: No results for input(s): POCGLU, POCNA, POCK, POCCL, POCBUN, POCHEMO, POCHCT in the last 72 hours.     Coags:   Lab Results   Component Value Date    PROTIME 10.4 02/22/2013    PROTIME 10.1 03/20/2012    INR 1.0 02/22/2013       HCG (If Applicable): No results found for: PREGTESTUR, PREGSERUM, HCG, HCGQUANT     ABGs: No results found for: PHART, PO2ART, HPA7VTT, CRX6AFA, BEART, I0KEKLVX     Type & Screen (If Applicable):  No results found for: [facility]    Drug/Infectious Status (If Applicable):  No results found for: HIV, HEPCAB    COVID-19 Screening (If Applicable):   Lab Results   Component Value Date    COVID19 Not Detected 09/18/2020         Anesthesia Evaluation  Patient summary reviewed and Nursing notes reviewed no history of anesthetic complications:   Airway: Mallampati: II  TM distance: >3 FB   Neck ROM: full  Mouth opening: > = 3 FB Dental: normal exam   (+) upper dentures      Pulmonary:Negative Pulmonary ROS and normal exam                               Cardiovascular:Negative CV ROS  Exercise tolerance: good (>4 METS),   (+) hypertension:,       ECG reviewed               Beta Blocker:  Not on Beta Blocker         Neuro/Psych:   Negative Neuro/Psych ROS              GI/Hepatic/Renal: Neg GI/Hepatic/Renal ROS  (+) GERD:,           Endo/Other: Negative Endo/Other ROS   (+) DiabetesType II DM, well controlled, , .          Pt had PAT visit. Abdominal:           Vascular: negative vascular ROS. Anesthesia Plan      general     ASA 2       Induction: intravenous. MIPS: Postoperative opioids intended and Prophylactic antiemetics administered. Anesthetic plan and risks discussed with patient. Plan discussed with CRNA.     Attending anesthesiologist reviewed and agrees with Pre Eval content              Valerio Ko MD   9/23/2020

## 2020-09-23 NOTE — TELEPHONE ENCOUNTER
Pt would like a call if at all possible today as she has questions. Having surgery 9/24/2020.      [facility], [facility]: [phone]

## 2020-09-24 ENCOUNTER — TELEPHONE (OUTPATIENT)
Dept: OBGYN CLINIC | Age: 85
End: 2020-09-24

## 2020-09-24 ENCOUNTER — ANESTHESIA (OUTPATIENT)
Dept: OPERATING ROOM | Age: 85
End: 2020-09-24
Payer: MEDICARE

## 2020-09-24 ENCOUNTER — HOSPITAL ENCOUNTER (OUTPATIENT)
Age: 85
Setting detail: OUTPATIENT SURGERY
Discharge: HOME | End: 2020-09-24
Attending: OBSTETRICS & GYNECOLOGY | Admitting: OBSTETRICS & GYNECOLOGY
Payer: MEDICARE

## 2020-09-24 VITALS — DIASTOLIC BLOOD PRESSURE: 120 MMHG | OXYGEN SATURATION: 100 % | TEMPERATURE: 94.3 F | SYSTOLIC BLOOD PRESSURE: 216 MMHG

## 2020-09-24 VITALS
RESPIRATION RATE: 20 BRPM | SYSTOLIC BLOOD PRESSURE: 177 MMHG | TEMPERATURE: 97.6 F | OXYGEN SATURATION: 96 % | DIASTOLIC BLOOD PRESSURE: 74 MMHG | HEART RATE: 84 BPM

## 2020-09-24 LAB
GLUCOSE BLD-MCNC: 129 MG/DL (ref 60–115)
GLUCOSE BLD-MCNC: 162 MG/DL (ref 60–115)
PERFORMED ON: ABNORMAL
PERFORMED ON: ABNORMAL

## 2020-09-24 PROCEDURE — 64585 REV/RMV PERPH NSTIM ELTRD RA: CPT | Performed by: OBSTETRICS & GYNECOLOGY

## 2020-09-24 PROCEDURE — 6360000002 HC RX W HCPCS: Performed by: ANESTHESIOLOGY

## 2020-09-24 PROCEDURE — 3600000004 HC SURGERY LEVEL 4 BASE: Performed by: OBSTETRICS & GYNECOLOGY

## 2020-09-24 PROCEDURE — 2580000003 HC RX 258: Performed by: OBSTETRICS & GYNECOLOGY

## 2020-09-24 PROCEDURE — 2500000003 HC RX 250 WO HCPCS: Performed by: OBSTETRICS & GYNECOLOGY

## 2020-09-24 PROCEDURE — 64595 REV/RMV PRPH SAC/GSTR NPG/R: CPT | Performed by: OBSTETRICS & GYNECOLOGY

## 2020-09-24 PROCEDURE — 3700000000 HC ANESTHESIA ATTENDED CARE: Performed by: OBSTETRICS & GYNECOLOGY

## 2020-09-24 PROCEDURE — 6360000002 HC RX W HCPCS: Performed by: OBSTETRICS & GYNECOLOGY

## 2020-09-24 PROCEDURE — 7100000010 HC PHASE II RECOVERY - FIRST 15 MIN: Performed by: OBSTETRICS & GYNECOLOGY

## 2020-09-24 PROCEDURE — 3600000014 HC SURGERY LEVEL 4 ADDTL 15MIN: Performed by: OBSTETRICS & GYNECOLOGY

## 2020-09-24 PROCEDURE — 2580000003 HC RX 258: Performed by: NURSE PRACTITIONER

## 2020-09-24 PROCEDURE — 3700000001 HC ADD 15 MINUTES (ANESTHESIA): Performed by: OBSTETRICS & GYNECOLOGY

## 2020-09-24 PROCEDURE — C1894 INTRO/SHEATH, NON-LASER: HCPCS | Performed by: OBSTETRICS & GYNECOLOGY

## 2020-09-24 PROCEDURE — 7100000001 HC PACU RECOVERY - ADDTL 15 MIN: Performed by: OBSTETRICS & GYNECOLOGY

## 2020-09-24 PROCEDURE — 52287 CYSTOSCOPY CHEMODENERVATION: CPT | Performed by: OBSTETRICS & GYNECOLOGY

## 2020-09-24 PROCEDURE — 7100000011 HC PHASE II RECOVERY - ADDTL 15 MIN: Performed by: OBSTETRICS & GYNECOLOGY

## 2020-09-24 PROCEDURE — 2709999900 HC NON-CHARGEABLE SUPPLY: Performed by: OBSTETRICS & GYNECOLOGY

## 2020-09-24 PROCEDURE — 7100000000 HC PACU RECOVERY - FIRST 15 MIN: Performed by: OBSTETRICS & GYNECOLOGY

## 2020-09-24 PROCEDURE — 2500000003 HC RX 250 WO HCPCS: Performed by: ANESTHESIOLOGY

## 2020-09-24 RX ORDER — LIDOCAINE HYDROCHLORIDE AND EPINEPHRINE 10; 10 MG/ML; UG/ML
INJECTION, SOLUTION INFILTRATION; PERINEURAL PRN
Status: DISCONTINUED | OUTPATIENT
Start: 2020-09-24 | End: 2020-09-24 | Stop reason: ALTCHOICE

## 2020-09-24 RX ORDER — DIPHENHYDRAMINE HYDROCHLORIDE 50 MG/ML
12.5 INJECTION INTRAMUSCULAR; INTRAVENOUS
Status: DISCONTINUED | OUTPATIENT
Start: 2020-09-24 | End: 2020-09-24 | Stop reason: HOSPADM

## 2020-09-24 RX ORDER — MEPERIDINE HYDROCHLORIDE 25 MG/ML
12.5 INJECTION INTRAMUSCULAR; INTRAVENOUS; SUBCUTANEOUS EVERY 5 MIN PRN
Status: DISCONTINUED | OUTPATIENT
Start: 2020-09-24 | End: 2020-09-24 | Stop reason: HOSPADM

## 2020-09-24 RX ORDER — DEXAMETHASONE SODIUM PHOSPHATE 4 MG/ML
INJECTION, SOLUTION INTRA-ARTICULAR; INTRALESIONAL; INTRAMUSCULAR; INTRAVENOUS; SOFT TISSUE PRN
Status: DISCONTINUED | OUTPATIENT
Start: 2020-09-24 | End: 2020-09-24 | Stop reason: SDUPTHER

## 2020-09-24 RX ORDER — CEPHALEXIN 500 MG/1
500 CAPSULE ORAL 4 TIMES DAILY
Qty: 28 CAPSULE | Refills: 0 | Status: ON HOLD | OUTPATIENT
Start: 2020-09-24 | End: 2020-10-09 | Stop reason: HOSPADM

## 2020-09-24 RX ORDER — 0.9 % SODIUM CHLORIDE 0.9 %
500 INTRAVENOUS SOLUTION INTRAVENOUS
Status: DISCONTINUED | OUTPATIENT
Start: 2020-09-24 | End: 2020-09-24 | Stop reason: HOSPADM

## 2020-09-24 RX ORDER — SODIUM CHLORIDE 0.9 % (FLUSH) 0.9 %
10 SYRINGE (ML) INJECTION PRN
Status: DISCONTINUED | OUTPATIENT
Start: 2020-09-24 | End: 2020-09-24 | Stop reason: HOSPADM

## 2020-09-24 RX ORDER — PROPOFOL 10 MG/ML
INJECTION, EMULSION INTRAVENOUS PRN
Status: DISCONTINUED | OUTPATIENT
Start: 2020-09-24 | End: 2020-09-24 | Stop reason: SDUPTHER

## 2020-09-24 RX ORDER — FENTANYL CITRATE 50 UG/ML
INJECTION, SOLUTION INTRAMUSCULAR; INTRAVENOUS PRN
Status: DISCONTINUED | OUTPATIENT
Start: 2020-09-24 | End: 2020-09-24 | Stop reason: SDUPTHER

## 2020-09-24 RX ORDER — LIDOCAINE HYDROCHLORIDE 20 MG/ML
INJECTION, SOLUTION EPIDURAL; INFILTRATION; INTRACAUDAL; PERINEURAL PRN
Status: DISCONTINUED | OUTPATIENT
Start: 2020-09-24 | End: 2020-09-24 | Stop reason: SDUPTHER

## 2020-09-24 RX ORDER — ROCURONIUM BROMIDE 10 MG/ML
INJECTION, SOLUTION INTRAVENOUS PRN
Status: DISCONTINUED | OUTPATIENT
Start: 2020-09-24 | End: 2020-09-24 | Stop reason: SDUPTHER

## 2020-09-24 RX ORDER — IBUPROFEN 800 MG/1
800 TABLET ORAL EVERY 6 HOURS PRN
Qty: 120 TABLET | Refills: 3 | Status: SHIPPED | OUTPATIENT
Start: 2020-09-24

## 2020-09-24 RX ORDER — SODIUM CHLORIDE, SODIUM LACTATE, POTASSIUM CHLORIDE, CALCIUM CHLORIDE 600; 310; 30; 20 MG/100ML; MG/100ML; MG/100ML; MG/100ML
INJECTION, SOLUTION INTRAVENOUS CONTINUOUS
Status: DISCONTINUED | OUTPATIENT
Start: 2020-09-24 | End: 2020-09-24 | Stop reason: HOSPADM

## 2020-09-24 RX ORDER — SODIUM CHLORIDE 0.9 % (FLUSH) 0.9 %
10 SYRINGE (ML) INJECTION EVERY 12 HOURS SCHEDULED
Status: DISCONTINUED | OUTPATIENT
Start: 2020-09-24 | End: 2020-09-24 | Stop reason: HOSPADM

## 2020-09-24 RX ORDER — SODIUM CHLORIDE 0.9 % (FLUSH) 0.9 %
SYRINGE (ML) INJECTION PRN
Status: DISCONTINUED | OUTPATIENT
Start: 2020-09-24 | End: 2020-09-24 | Stop reason: ALTCHOICE

## 2020-09-24 RX ORDER — MAGNESIUM HYDROXIDE 1200 MG/15ML
LIQUID ORAL CONTINUOUS PRN
Status: COMPLETED | OUTPATIENT
Start: 2020-09-24 | End: 2020-09-24

## 2020-09-24 RX ORDER — METOCLOPRAMIDE HYDROCHLORIDE 5 MG/ML
10 INJECTION INTRAMUSCULAR; INTRAVENOUS
Status: DISCONTINUED | OUTPATIENT
Start: 2020-09-24 | End: 2020-09-24 | Stop reason: HOSPADM

## 2020-09-24 RX ORDER — LIDOCAINE HYDROCHLORIDE 10 MG/ML
1 INJECTION, SOLUTION EPIDURAL; INFILTRATION; INTRACAUDAL; PERINEURAL
Status: COMPLETED | OUTPATIENT
Start: 2020-09-24 | End: 2020-09-24

## 2020-09-24 RX ORDER — ONDANSETRON 2 MG/ML
4 INJECTION INTRAMUSCULAR; INTRAVENOUS
Status: DISCONTINUED | OUTPATIENT
Start: 2020-09-24 | End: 2020-09-24 | Stop reason: HOSPADM

## 2020-09-24 RX ORDER — LABETALOL HYDROCHLORIDE 5 MG/ML
5 INJECTION, SOLUTION INTRAVENOUS EVERY 10 MIN PRN
Status: DISCONTINUED | OUTPATIENT
Start: 2020-09-24 | End: 2020-09-24 | Stop reason: HOSPADM

## 2020-09-24 RX ORDER — ONDANSETRON 2 MG/ML
INJECTION INTRAMUSCULAR; INTRAVENOUS PRN
Status: DISCONTINUED | OUTPATIENT
Start: 2020-09-24 | End: 2020-09-24 | Stop reason: SDUPTHER

## 2020-09-24 RX ORDER — ACETAMINOPHEN 500 MG
1000 TABLET ORAL EVERY 6 HOURS PRN
Qty: 60 TABLET | Refills: 1 | Status: SHIPPED | OUTPATIENT
Start: 2020-09-24

## 2020-09-24 RX ORDER — FENTANYL CITRATE 50 UG/ML
25 INJECTION, SOLUTION INTRAMUSCULAR; INTRAVENOUS EVERY 5 MIN PRN
Status: DISCONTINUED | OUTPATIENT
Start: 2020-09-24 | End: 2020-09-24 | Stop reason: HOSPADM

## 2020-09-24 RX ORDER — OXYCODONE HYDROCHLORIDE AND ACETAMINOPHEN 5; 325 MG/1; MG/1
2 TABLET ORAL NIGHTLY PRN
Qty: 10 TABLET | Refills: 0 | Status: SHIPPED | OUTPATIENT
Start: 2020-09-24 | End: 2020-09-29

## 2020-09-24 RX ADMIN — DEXAMETHASONE SODIUM PHOSPHATE 4 MG: 4 INJECTION INTRA-ARTICULAR; INTRALESIONAL; INTRAMUSCULAR; INTRAVENOUS; SOFT TISSUE at 10:10

## 2020-09-24 RX ADMIN — PROPOFOL 120 MG: 10 INJECTION, EMULSION INTRAVENOUS at 09:29

## 2020-09-24 RX ADMIN — FENTANYL CITRATE 25 MCG: 50 INJECTION, SOLUTION INTRAMUSCULAR; INTRAVENOUS at 09:29

## 2020-09-24 RX ADMIN — ONDANSETRON 4 MG: 2 INJECTION INTRAMUSCULAR; INTRAVENOUS at 10:30

## 2020-09-24 RX ADMIN — PROPOFOL 30 MG: 10 INJECTION, EMULSION INTRAVENOUS at 10:19

## 2020-09-24 RX ADMIN — SODIUM CHLORIDE, POTASSIUM CHLORIDE, SODIUM LACTATE AND CALCIUM CHLORIDE: 600; 310; 30; 20 INJECTION, SOLUTION INTRAVENOUS at 09:05

## 2020-09-24 RX ADMIN — ROCURONIUM BROMIDE 50 MG: 10 INJECTION INTRAVENOUS at 09:29

## 2020-09-24 RX ADMIN — LIDOCAINE HYDROCHLORIDE 50 MG: 20 INJECTION, SOLUTION EPIDURAL; INFILTRATION; INTRACAUDAL; PERINEURAL at 09:29

## 2020-09-24 RX ADMIN — SUGAMMADEX 200 MG: 100 INJECTION, SOLUTION INTRAVENOUS at 10:38

## 2020-09-24 RX ADMIN — LIDOCAINE HYDROCHLORIDE 0.1 ML: 10 INJECTION, SOLUTION EPIDURAL; INFILTRATION; INTRACAUDAL; PERINEURAL at 08:24

## 2020-09-24 RX ADMIN — CEFOXITIN SODIUM 1 G: 2 POWDER, FOR SOLUTION INTRAVENOUS at 09:34

## 2020-09-24 RX ADMIN — SODIUM CHLORIDE, POTASSIUM CHLORIDE, SODIUM LACTATE AND CALCIUM CHLORIDE: 600; 310; 30; 20 INJECTION, SOLUTION INTRAVENOUS at 08:25

## 2020-09-24 RX ADMIN — FENTANYL CITRATE 25 MCG: 50 INJECTION, SOLUTION INTRAMUSCULAR; INTRAVENOUS at 09:54

## 2020-09-24 ASSESSMENT — PULMONARY FUNCTION TESTS
PIF_VALUE: 25
PIF_VALUE: 24
PIF_VALUE: 20
PIF_VALUE: 20
PIF_VALUE: 22
PIF_VALUE: 14
PIF_VALUE: 20
PIF_VALUE: 20
PIF_VALUE: 25
PIF_VALUE: 22
PIF_VALUE: 25
PIF_VALUE: 3
PIF_VALUE: 2
PIF_VALUE: 20
PIF_VALUE: 22
PIF_VALUE: 1
PIF_VALUE: 20
PIF_VALUE: 24
PIF_VALUE: 25
PIF_VALUE: 21
PIF_VALUE: 20
PIF_VALUE: 2
PIF_VALUE: 20
PIF_VALUE: 23
PIF_VALUE: 25
PIF_VALUE: 25
PIF_VALUE: 4
PIF_VALUE: 24
PIF_VALUE: 25
PIF_VALUE: 24
PIF_VALUE: 23
PIF_VALUE: 17
PIF_VALUE: 24
PIF_VALUE: 25
PIF_VALUE: 20
PIF_VALUE: 20
PIF_VALUE: 16
PIF_VALUE: 2
PIF_VALUE: 3
PIF_VALUE: 21
PIF_VALUE: 20
PIF_VALUE: 20
PIF_VALUE: 24
PIF_VALUE: 2
PIF_VALUE: 20
PIF_VALUE: 20
PIF_VALUE: 1
PIF_VALUE: 20
PIF_VALUE: 25
PIF_VALUE: 20
PIF_VALUE: 25
PIF_VALUE: 23
PIF_VALUE: 26
PIF_VALUE: 24
PIF_VALUE: 20
PIF_VALUE: 22
PIF_VALUE: 24
PIF_VALUE: 0
PIF_VALUE: 23
PIF_VALUE: 3
PIF_VALUE: 25
PIF_VALUE: 24
PIF_VALUE: 20
PIF_VALUE: 26
PIF_VALUE: 24
PIF_VALUE: 30
PIF_VALUE: 21
PIF_VALUE: 26
PIF_VALUE: 20
PIF_VALUE: 20
PIF_VALUE: 25
PIF_VALUE: 21
PIF_VALUE: 20
PIF_VALUE: 2
PIF_VALUE: 25
PIF_VALUE: 24
PIF_VALUE: 23
PIF_VALUE: 20
PIF_VALUE: 0
PIF_VALUE: 20
PIF_VALUE: 17
PIF_VALUE: 20
PIF_VALUE: 24
PIF_VALUE: 26

## 2020-09-24 ASSESSMENT — PAIN SCALES - GENERAL
PAINLEVEL_OUTOF10: 0

## 2020-09-24 ASSESSMENT — PAIN - FUNCTIONAL ASSESSMENT: PAIN_FUNCTIONAL_ASSESSMENT: 0-10

## 2020-09-24 ASSESSMENT — PAIN DESCRIPTION - PAIN TYPE: TYPE: SURGICAL PAIN

## 2020-09-24 NOTE — OP NOTE
Operative Note      Patient: Eric Sanabria  YOB: 1935  MRN: [id number]    Date of Procedure: 9/24/2020    Pre-Op Diagnosis: refractory URGE INCONTINENCE    Post-Op Diagnosis: Same       Procedure(s):  INTERSTIM REVISION  WITH BOTOX BLADDER INJECTIONS (100 UNITS)    Surgeon(s):  Rakesh Houston MD    Assistant:  First Assistant: Piotr Hernandez    Anesthesia: General    Estimated Blood Loss (mL): Minimal    Complications: None    Specimens:   * No specimens in log *    Implants:  * No implants in log *      Drains: * No LDAs found *    Findings: interstim electrode and battery removed intact with no broken pieces or missing parts . Emilie Lux M.D., F.A.C.O.G     Surgical technique     Patient preparation  The patient is prepped in the normal sterile manner first lying prone under general anaesthesia. IPG dissection It was easy to identify the site of the IPG from the scar of the previous procedure in the lower lumbar quadrant. After locating the site of the IPG, dissection was performed to remove it , a 10 scalpel blade was used to incise the skin all the way down to the device which was pulled out of the incision and then the connecting wire is cut. Locating the lead insertion    By gently pulling on the distal end of the wire, a skin depression will occur more medially over the site of the percutaneous implant of the tined lead where it goes straight to S3 foramen. Dissection to Marker Band B  A 0.5 cm incision is made over the site of the greatest depression and dissection is carried out to find the lead. The lead was then grasped with a hemostat. The distal end of the lead wire can now be pulled through to the new 3 cm incision. Dissection is then continued deep to the level of the fascia until Marker Band B is reached.  The lead wire should never be  pulled with significant tension before Marker Band B, as the region between Markers B and C is very thinly insulated and will break with cystoscopic sheath, cystoscope and all instruments were intact and removed from the patient. The patients abdomen was then palpated to ensure there was no change in physical exam. The patient was awakened by anesthesia and transferred to PACU in stable condition. Patient tolerated the procedure well. PLAN: The patient will be discharged after meeting anesthesia criteria. Patient was given given prescriptions for nothing at this time . The patient understands if there is any nausea, vomiting, fever, chills or develops gross hematuria, persistent or new pain or symptoms to come back to the Emergency Room for evaluation by urologic surgery. Patient will follow up with me   in 1-2 weeks      Guerline Ramírez M.D., F.A.SHERON.O. G

## 2020-09-24 NOTE — TELEPHONE ENCOUNTER
Pharmacy calling regarding cephalexin prescription. They are stating she is allergic to that class of drugs.   Can something else be sent

## 2020-09-24 NOTE — PROGRESS NOTES
Clarified order for consent and the consent. Dr. Dg Zavala said that both are fine as they are. He does not want them changed. He stated that revision includes removal.  He spoke with the patient also and both Dr. Dg Zavala and the patient agree that they are removing the interstim. Ashia (surgery RN) was included in this communication and everyone is agreement.

## 2020-09-24 NOTE — BRIEF OP NOTE
Brief Postoperative Note      Patient: Humza Garnica  YOB: 1935  MRN: [id number]    Date of Procedure: 9/24/2020    Pre-Op Diagnosis: refractory URGE INCONTINENCE    Post-Op Diagnosis: Same       Procedure(s):  INTERSTIM REVISION  WITH BOTOX BLADDER INJECTIONS (100 UNITS)    Surgeon(s):  Cheryl Gifford MD    Assistant:  First Assistant: Phillip Zapata    Anesthesia: General    Estimated Blood Loss (mL): Minimal    Complications: None    Specimens:   * No specimens in log *    Implants:  * No implants in log *      Drains: * No LDAs found *    Findings: interstim electrode and battery removed intact with no broken pieces or missing parts .      Electronically signed by Cheryl Gifford MD on 9/24/2020 at 10:37 AM

## 2020-09-24 NOTE — H&P
Patient ID:  Lucila Hernandez is a 82 y.o. female with chief complaint of:       Chief Complaint   Patient presents with    Urinary Frequency       pt here tod c/o UTI symptoms, urinary frequency, leakage, pt states that she had a UTI before but she feels it never went away        Patient presents today with concerns about possible UTI. Patient has problem with urge incontinence and overflow which initially treated with InterStim x2 and has failed since then. Patient was placed on Myrbetriq and she had some improvement but there was swelling noted with the use of medication so it was discontinued.   Patient desires to move away from daily pad usage.        Past Medical History        Past Medical History:   Diagnosis Date    Diabetes ([facility] [address]      Diabetes ([facility] [address]      GERD (gastroesophageal reflux disease)      Hyperlipidemia      Hypertension      Sleep difficulties          Past Surgical History         Past Surgical History:   Procedure Laterality Date    APPENDECTOMY        BREAST BIOPSY        TONSILLECTOMY AND ADENOIDECTOMY        WRIST SURGERY Right          Family History         Family History   Problem Relation Age of Onset    Cancer Mother           colon    Cancer Father           colon    Cancer Maternal Aunt           colon    Cancer Maternal Grandmother           colon               Current Outpatient Medications on File Prior to Visit   Medication Sig Dispense Refill    mirabegron (MYRBETRIQ) 25 MG TB24 Take 1 tablet by mouth daily 30 tablet 5    Mirabegron ER 50 MG TB24 Take 50 mg by mouth daily 30 tablet 1    ibuprofen (ADVIL;MOTRIN) 200 MG tablet Take 200 mg by mouth        MISC NATURAL PRODUCTS PO Take 10 mg by mouth         MISC NATURAL PRODUCTS PO Take 3 tablets by mouth         Melatonin 10 MG TABS Take 10 mg by mouth        Mirabegron ER (MYRBETRIQ) 50 MG TB24 Take 50 mg by mouth daily 21 tablet 0    nystatin (NYSTATIN) 049230 UNIT/GM powder APPLY 3 TIMES DAILY 1 Bottle 0  esomeprazole (NEXIUM) 40 MG capsule Take by mouth        Multiple Vitamins-Minerals (MULTIVITAMIN ADULT) TABS Take by mouth        traMADol (ULTRAM) 50 MG tablet Take 50 mg by mouth        amLODIPine-benazepril (LOTREL) 5-20 MG per capsule          bisoprolol (ZEBETA) 10 MG tablet          pravastatin (PRAVACHOL) 40 MG tablet          metFORMIN (GLUCOPHAGE) 500 MG tablet            No current facility-administered medications on file prior to visit. Allergies:  Actos [pioglitazone] and Codeine     Review of Systems   Constitutional: Negative for fatigue and fever. Respiratory: Negative for apnea and shortness of breath. Cardiovascular: Negative for chest pain and palpitations. Genitourinary: Positive for frequency (With urinary loss large amounts) and urgency. Negative for difficulty urinating, dysuria, pelvic pain, vaginal bleeding and vaginal discharge. Neurological: Negative for dizziness, weakness and light-headedness. Psychiatric/Behavioral: Negative for agitation and dysphoric mood.         Objective:   BP (!) 144/70   Ht 4' 11\" (1.499 m)   Wt 146 lb 12.8 oz (66.6 kg)   BMI 29.65 kg/m²       Physical Exam     Assessment:       Diagnosis Orders    Refractory urge incontinence              Complication with previous interstim              interstim failiure                Plan:       Decision for interstim removal   botox bladder injections 100 units. Alec Yanes M.D., F.A.C.O. G

## 2020-09-24 NOTE — PROGRESS NOTES
Discharge instructions reviewed with patient. She did verbalize understanding of these intructions. She currently denies any complaints of pain. Taking fluids well, Vital signs are stable at this time.   No acute distress noted

## 2020-09-25 NOTE — TELEPHONE ENCOUNTER
I do not think she is allergic , she received the same medication in the OR. Can you please let them know that . She is allergic to penicillin/ ampicillin. She received it in the OR.

## 2020-10-05 ENCOUNTER — APPOINTMENT (OUTPATIENT)
Dept: CT IMAGING | Age: 85
DRG: 392 | End: 2020-10-05
Payer: MEDICARE

## 2020-10-05 ENCOUNTER — HOSPITAL ENCOUNTER (INPATIENT)
Age: 85
LOS: 3 days | Discharge: HOME | DRG: 392 | End: 2020-10-09
Attending: EMERGENCY MEDICINE | Admitting: INTERNAL MEDICINE
Payer: MEDICARE

## 2020-10-05 DIAGNOSIS — R10.84 GENERALIZED ABDOMINAL PAIN: Primary | ICD-10-CM

## 2020-10-05 DIAGNOSIS — K56.609 COLONIC OBSTRUCTION (HCC): ICD-10-CM

## 2020-10-05 LAB
ALBUMIN SERPL-MCNC: 4 G/DL (ref 3.5–4.6)
ALP BLD-CCNC: 74 U/L (ref 40–130)
ALT SERPL-CCNC: 16 U/L (ref 0–33)
ANION GAP SERPL CALCULATED.3IONS-SCNC: 14 MEQ/L (ref 9–15)
AST SERPL-CCNC: 24 U/L (ref 0–35)
BASOPHILS ABSOLUTE: 0.1 K/UL (ref 0–0.2)
BASOPHILS RELATIVE PERCENT: 0.6 %
BILIRUB SERPL-MCNC: <0.2 MG/DL (ref 0.2–0.7)
BUN BLDV-MCNC: 19 MG/DL (ref 8–23)
CALCIUM SERPL-MCNC: 9.8 MG/DL (ref 8.5–9.9)
CHLORIDE BLD-SCNC: 102 MEQ/L (ref 95–107)
CO2: 22 MEQ/L (ref 20–31)
CREAT SERPL-MCNC: 0.7 MG/DL (ref 0.5–0.9)
EKG ATRIAL RATE: 71 BPM
EKG P AXIS: 78 DEGREES
EKG P-R INTERVAL: 166 MS
EKG Q-T INTERVAL: 370 MS
EKG QRS DURATION: 78 MS
EKG QTC CALCULATION (BAZETT): 402 MS
EKG R AXIS: -17 DEGREES
EKG T AXIS: 14 DEGREES
EKG VENTRICULAR RATE: 71 BPM
EOSINOPHILS ABSOLUTE: 0.3 K/UL (ref 0–0.7)
EOSINOPHILS RELATIVE PERCENT: 3 %
GFR AFRICAN AMERICAN: >60
GFR NON-AFRICAN AMERICAN: >60
GLOBULIN: 3.3 G/DL (ref 2.3–3.5)
GLUCOSE BLD-MCNC: 156 MG/DL (ref 70–99)
HCT VFR BLD CALC: 29.5 % (ref 37–47)
HEMOGLOBIN: 9.6 G/DL (ref 12–16)
LYMPHOCYTES ABSOLUTE: 2 K/UL (ref 1–4.8)
LYMPHOCYTES RELATIVE PERCENT: 19.6 %
MCH RBC QN AUTO: 24.7 PG (ref 27–31.3)
MCHC RBC AUTO-ENTMCNC: 32.7 % (ref 33–37)
MCV RBC AUTO: 75.6 FL (ref 82–100)
MONOCYTES ABSOLUTE: 0.8 K/UL (ref 0.2–0.8)
MONOCYTES RELATIVE PERCENT: 7.5 %
NEUTROPHILS ABSOLUTE: 7.2 K/UL (ref 1.4–6.5)
NEUTROPHILS RELATIVE PERCENT: 69.3 %
PDW BLD-RTO: 17.5 % (ref 11.5–14.5)
PLATELET # BLD: 485 K/UL (ref 130–400)
POTASSIUM SERPL-SCNC: 3.5 MEQ/L (ref 3.4–4.9)
RBC # BLD: 3.9 M/UL (ref 4.2–5.4)
SODIUM BLD-SCNC: 138 MEQ/L (ref 135–144)
TOTAL PROTEIN: 7.3 G/DL (ref 6.3–8)
TROPONIN: <0.01 NG/ML (ref 0–0.01)
WBC # BLD: 10.3 K/UL (ref 4.8–10.8)

## 2020-10-05 PROCEDURE — 2580000003 HC RX 258: Performed by: PHYSICIAN ASSISTANT

## 2020-10-05 PROCEDURE — 36415 COLL VENOUS BLD VENIPUNCTURE: CPT

## 2020-10-05 PROCEDURE — 85025 COMPLETE CBC W/AUTO DIFF WBC: CPT

## 2020-10-05 PROCEDURE — 99285 EMERGENCY DEPT VISIT HI MDM: CPT

## 2020-10-05 PROCEDURE — 84484 ASSAY OF TROPONIN QUANT: CPT

## 2020-10-05 PROCEDURE — 80053 COMPREHEN METABOLIC PANEL: CPT

## 2020-10-05 PROCEDURE — 93005 ELECTROCARDIOGRAM TRACING: CPT | Performed by: PHYSICIAN ASSISTANT

## 2020-10-05 RX ORDER — 0.9 % SODIUM CHLORIDE 0.9 %
500 INTRAVENOUS SOLUTION INTRAVENOUS ONCE
Status: COMPLETED | OUTPATIENT
Start: 2020-10-05 | End: 2020-10-05

## 2020-10-05 RX ADMIN — SODIUM CHLORIDE 500 ML: 9 INJECTION, SOLUTION INTRAVENOUS at 23:31

## 2020-10-06 ENCOUNTER — APPOINTMENT (OUTPATIENT)
Dept: CT IMAGING | Age: 85
DRG: 392 | End: 2020-10-06
Payer: MEDICARE

## 2020-10-06 ENCOUNTER — APPOINTMENT (OUTPATIENT)
Dept: GENERAL RADIOLOGY | Age: 85
DRG: 392 | End: 2020-10-06
Payer: MEDICARE

## 2020-10-06 PROBLEM — R10.9 ABDOMINAL PAIN: Status: ACTIVE | Noted: 2020-10-06

## 2020-10-06 PROBLEM — K56.609 COLONIC OBSTRUCTION (HCC): Status: ACTIVE | Noted: 2020-10-06

## 2020-10-06 LAB
CEA: 2.2 NG/ML (ref 0–5.5)
GLUCOSE BLD-MCNC: 113 MG/DL (ref 60–115)
GLUCOSE BLD-MCNC: 145 MG/DL (ref 60–115)
GLUCOSE BLD-MCNC: 153 MG/DL (ref 60–115)
GLUCOSE BLD-MCNC: 173 MG/DL (ref 60–115)
GLUCOSE BLD-MCNC: 186 MG/DL (ref 60–115)
PERFORMED ON: ABNORMAL
PERFORMED ON: NORMAL

## 2020-10-06 PROCEDURE — 6370000000 HC RX 637 (ALT 250 FOR IP): Performed by: EMERGENCY MEDICINE

## 2020-10-06 PROCEDURE — 70450 CT HEAD/BRAIN W/O DYE: CPT

## 2020-10-06 PROCEDURE — 82378 CARCINOEMBRYONIC ANTIGEN: CPT

## 2020-10-06 PROCEDURE — 6360000002 HC RX W HCPCS: Performed by: INTERNAL MEDICINE

## 2020-10-06 PROCEDURE — 74177 CT ABD & PELVIS W/CONTRAST: CPT

## 2020-10-06 PROCEDURE — 74022 RADEX COMPL AQT ABD SERIES: CPT

## 2020-10-06 PROCEDURE — 6360000004 HC RX CONTRAST MEDICATION: Performed by: EMERGENCY MEDICINE

## 2020-10-06 PROCEDURE — 6370000000 HC RX 637 (ALT 250 FOR IP): Performed by: INTERNAL MEDICINE

## 2020-10-06 PROCEDURE — 2580000003 HC RX 258: Performed by: INTERNAL MEDICINE

## 2020-10-06 PROCEDURE — 99221 1ST HOSP IP/OBS SF/LOW 40: CPT | Performed by: COLON & RECTAL SURGERY

## 2020-10-06 PROCEDURE — 1210000000 HC MED SURG R&B

## 2020-10-06 PROCEDURE — 36415 COLL VENOUS BLD VENIPUNCTURE: CPT

## 2020-10-06 PROCEDURE — 97161 PT EVAL LOW COMPLEX 20 MIN: CPT

## 2020-10-06 PROCEDURE — 99222 1ST HOSP IP/OBS MODERATE 55: CPT | Performed by: SPECIALIST

## 2020-10-06 PROCEDURE — 2580000003 HC RX 258: Performed by: EMERGENCY MEDICINE

## 2020-10-06 PROCEDURE — 6370000000 HC RX 637 (ALT 250 FOR IP): Performed by: PHYSICIAN ASSISTANT

## 2020-10-06 PROCEDURE — 93010 ELECTROCARDIOGRAM REPORT: CPT | Performed by: INTERNAL MEDICINE

## 2020-10-06 PROCEDURE — 6370000000 HC RX 637 (ALT 250 FOR IP): Performed by: SPECIALIST

## 2020-10-06 RX ORDER — MORPHINE SULFATE 2 MG/ML
2 INJECTION, SOLUTION INTRAMUSCULAR; INTRAVENOUS
Status: DISCONTINUED | OUTPATIENT
Start: 2020-10-06 | End: 2020-10-09 | Stop reason: HOSPADM

## 2020-10-06 RX ORDER — NICOTINE POLACRILEX 4 MG
15 LOZENGE BUCCAL PRN
Status: DISCONTINUED | OUTPATIENT
Start: 2020-10-06 | End: 2020-10-09 | Stop reason: HOSPADM

## 2020-10-06 RX ORDER — SODIUM CHLORIDE 0.9 % (FLUSH) 0.9 %
10 SYRINGE (ML) INJECTION EVERY 12 HOURS SCHEDULED
Status: DISCONTINUED | OUTPATIENT
Start: 2020-10-06 | End: 2020-10-09 | Stop reason: HOSPADM

## 2020-10-06 RX ORDER — POTASSIUM CHLORIDE 20 MEQ/1
40 TABLET, EXTENDED RELEASE ORAL PRN
Status: DISCONTINUED | OUTPATIENT
Start: 2020-10-06 | End: 2020-10-09 | Stop reason: HOSPADM

## 2020-10-06 RX ORDER — DEXTROSE MONOHYDRATE 50 MG/ML
100 INJECTION, SOLUTION INTRAVENOUS PRN
Status: DISCONTINUED | OUTPATIENT
Start: 2020-10-06 | End: 2020-10-09 | Stop reason: HOSPADM

## 2020-10-06 RX ORDER — PRAVASTATIN SODIUM 20 MG
20 TABLET ORAL DAILY
COMMUNITY

## 2020-10-06 RX ORDER — PROMETHAZINE HYDROCHLORIDE 12.5 MG/1
12.5 TABLET ORAL EVERY 6 HOURS PRN
Status: DISCONTINUED | OUTPATIENT
Start: 2020-10-06 | End: 2020-10-09 | Stop reason: HOSPADM

## 2020-10-06 RX ORDER — GLUCOSAMINE HCL/CHONDROITIN SU 500-400 MG
1 CAPSULE ORAL
COMMUNITY

## 2020-10-06 RX ORDER — MORPHINE SULFATE 4 MG/ML
4 INJECTION, SOLUTION INTRAMUSCULAR; INTRAVENOUS
Status: DISCONTINUED | OUTPATIENT
Start: 2020-10-06 | End: 2020-10-09 | Stop reason: HOSPADM

## 2020-10-06 RX ORDER — AMLODIPINE BESYLATE 5 MG/1
5 TABLET ORAL DAILY
Status: DISCONTINUED | OUTPATIENT
Start: 2020-10-06 | End: 2020-10-09 | Stop reason: HOSPADM

## 2020-10-06 RX ORDER — MAGNESIUM SULFATE IN WATER 40 MG/ML
2 INJECTION, SOLUTION INTRAVENOUS PRN
Status: DISCONTINUED | OUTPATIENT
Start: 2020-10-06 | End: 2020-10-09 | Stop reason: HOSPADM

## 2020-10-06 RX ORDER — DEXTROSE MONOHYDRATE 25 G/50ML
12.5 INJECTION, SOLUTION INTRAVENOUS PRN
Status: DISCONTINUED | OUTPATIENT
Start: 2020-10-06 | End: 2020-10-09 | Stop reason: HOSPADM

## 2020-10-06 RX ORDER — BISOPROLOL FUMARATE 10 MG/1
10 TABLET ORAL DAILY
COMMUNITY

## 2020-10-06 RX ORDER — METOPROLOL TARTRATE 50 MG/1
50 TABLET, FILM COATED ORAL 2 TIMES DAILY
Status: DISCONTINUED | OUTPATIENT
Start: 2020-10-06 | End: 2020-10-09 | Stop reason: HOSPADM

## 2020-10-06 RX ORDER — ONDANSETRON 2 MG/ML
4 INJECTION INTRAMUSCULAR; INTRAVENOUS EVERY 6 HOURS PRN
Status: DISCONTINUED | OUTPATIENT
Start: 2020-10-06 | End: 2020-10-09 | Stop reason: HOSPADM

## 2020-10-06 RX ORDER — POTASSIUM CHLORIDE 7.45 MG/ML
10 INJECTION INTRAVENOUS PRN
Status: DISCONTINUED | OUTPATIENT
Start: 2020-10-06 | End: 2020-10-09 | Stop reason: HOSPADM

## 2020-10-06 RX ORDER — TRAZODONE HYDROCHLORIDE 50 MG/1
50 TABLET ORAL NIGHTLY
Status: DISCONTINUED | OUTPATIENT
Start: 2020-10-06 | End: 2020-10-09 | Stop reason: HOSPADM

## 2020-10-06 RX ORDER — GABAPENTIN 300 MG/1
300 CAPSULE ORAL 3 TIMES DAILY
Status: DISCONTINUED | OUTPATIENT
Start: 2020-10-06 | End: 2020-10-09 | Stop reason: HOSPADM

## 2020-10-06 RX ORDER — AMLODIPINE BESYLATE 5 MG/1
5 TABLET ORAL DAILY
COMMUNITY

## 2020-10-06 RX ORDER — ACETAMINOPHEN 325 MG/1
650 TABLET ORAL EVERY 6 HOURS PRN
Status: DISCONTINUED | OUTPATIENT
Start: 2020-10-06 | End: 2020-10-09 | Stop reason: HOSPADM

## 2020-10-06 RX ORDER — SODIUM CHLORIDE 0.9 % (FLUSH) 0.9 %
10 SYRINGE (ML) INJECTION PRN
Status: DISCONTINUED | OUTPATIENT
Start: 2020-10-06 | End: 2020-10-09 | Stop reason: HOSPADM

## 2020-10-06 RX ORDER — GABAPENTIN 300 MG/1
300 CAPSULE ORAL 3 TIMES DAILY
COMMUNITY
End: 2021-04-29

## 2020-10-06 RX ORDER — DICYCLOMINE HYDROCHLORIDE 10 MG/1
20 CAPSULE ORAL ONCE
Status: COMPLETED | OUTPATIENT
Start: 2020-10-06 | End: 2020-10-06

## 2020-10-06 RX ORDER — TRAZODONE HYDROCHLORIDE 50 MG/1
50 TABLET ORAL NIGHTLY
COMMUNITY

## 2020-10-06 RX ORDER — POLYETHYLENE GLYCOL 3350 17 G/17G
17 POWDER, FOR SOLUTION ORAL DAILY PRN
Status: DISCONTINUED | OUTPATIENT
Start: 2020-10-06 | End: 2020-10-09 | Stop reason: HOSPADM

## 2020-10-06 RX ORDER — SODIUM CHLORIDE 9 MG/ML
INJECTION, SOLUTION INTRAVENOUS CONTINUOUS
Status: DISCONTINUED | OUTPATIENT
Start: 2020-10-06 | End: 2020-10-09 | Stop reason: HOSPADM

## 2020-10-06 RX ORDER — ACETAMINOPHEN 650 MG/1
650 SUPPOSITORY RECTAL EVERY 6 HOURS PRN
Status: DISCONTINUED | OUTPATIENT
Start: 2020-10-06 | End: 2020-10-09 | Stop reason: HOSPADM

## 2020-10-06 RX ORDER — SODIUM CHLORIDE 9 MG/ML
INJECTION, SOLUTION INTRAVENOUS CONTINUOUS
Status: DISCONTINUED | OUTPATIENT
Start: 2020-10-06 | End: 2020-10-07

## 2020-10-06 RX ORDER — PRAVASTATIN SODIUM 20 MG
20 TABLET ORAL DAILY
Status: DISCONTINUED | OUTPATIENT
Start: 2020-10-06 | End: 2020-10-09 | Stop reason: HOSPADM

## 2020-10-06 RX ADMIN — METOPROLOL TARTRATE 50 MG: 50 TABLET, FILM COATED ORAL at 20:48

## 2020-10-06 RX ADMIN — SODIUM CHLORIDE: 9 INJECTION, SOLUTION INTRAVENOUS at 10:21

## 2020-10-06 RX ADMIN — AMLODIPINE BESYLATE 5 MG: 5 TABLET ORAL at 17:31

## 2020-10-06 RX ADMIN — POLYETHYLENE GLYCOL-3350 AND ELECTROLYTES 2000 ML: 236; 6.74; 5.86; 2.97; 22.74 POWDER, FOR SOLUTION ORAL at 20:49

## 2020-10-06 RX ADMIN — SODIUM CHLORIDE: 9 INJECTION, SOLUTION INTRAVENOUS at 04:57

## 2020-10-06 RX ADMIN — TRAZODONE HYDROCHLORIDE 50 MG: 50 TABLET ORAL at 20:48

## 2020-10-06 RX ADMIN — DICYCLOMINE HYDROCHLORIDE 20 MG: 10 CAPSULE ORAL at 03:25

## 2020-10-06 RX ADMIN — PRAVASTATIN SODIUM 20 MG: 20 TABLET ORAL at 17:31

## 2020-10-06 RX ADMIN — MAGNESIUM CITRATE 296 ML: 1.75 LIQUID ORAL at 01:19

## 2020-10-06 RX ADMIN — GABAPENTIN 300 MG: 300 CAPSULE ORAL at 17:32

## 2020-10-06 RX ADMIN — MAGNESIUM CITRATE 296 ML: 1.75 LIQUID ORAL at 18:13

## 2020-10-06 RX ADMIN — INSULIN LISPRO 1 UNITS: 100 INJECTION, SOLUTION INTRAVENOUS; SUBCUTANEOUS at 17:32

## 2020-10-06 RX ADMIN — ENOXAPARIN SODIUM 40 MG: 40 INJECTION SUBCUTANEOUS at 20:48

## 2020-10-06 RX ADMIN — GABAPENTIN 300 MG: 300 CAPSULE ORAL at 20:48

## 2020-10-06 RX ADMIN — IOPAMIDOL 100 ML: 612 INJECTION, SOLUTION INTRAVENOUS at 04:01

## 2020-10-06 ASSESSMENT — ENCOUNTER SYMPTOMS
CHEST TIGHTNESS: 0
EYE DISCHARGE: 0
RHINORRHEA: 0
NAUSEA: 0
COLOR CHANGE: 0
SINUS PAIN: 0
ABDOMINAL PAIN: 0
DIARRHEA: 0
BACK PAIN: 0
ABDOMINAL DISTENTION: 1
EYE REDNESS: 0
SHORTNESS OF BREATH: 0
VOMITING: 0
COUGH: 0

## 2020-10-06 ASSESSMENT — PAIN DESCRIPTION - ORIENTATION: ORIENTATION: LEFT;LOWER

## 2020-10-06 ASSESSMENT — PAIN DESCRIPTION - LOCATION: LOCATION: ABDOMEN

## 2020-10-06 ASSESSMENT — PAIN DESCRIPTION - DESCRIPTORS: DESCRIPTORS: DISCOMFORT

## 2020-10-06 NOTE — CONSULTS
Consults    Patient Name: aHnnah Santana Date: 10/5/2020  9:52 PM  MR #: [id number]  : 1935    Attending Physician: Dalila Crane DO  Reason for consult: Abdominal distention and abdominal CAT scan    History of Presenting Illness:      Sherice Ortiz is a 82 y.o. female on hospital day 0 with a history of abdominal discomfort with distention since last 2 weeks, states that her symptoms started after she got Botox injection for bladder issues, prior to that she had normal bowel movement, noticed that she she was feeling fatigue week and had some abdominal distention and noticed a change in stool caliber, no history of any rectal bleeding, no fever no chills, no nausea no vomiting, patient has history of diverticulosis but no history of any diverticulitis, her last colonoscopy was about 10 years ago which reportedly showed diverticulosis, no history of any significant weight loss, patient was on NSAID. History Obtained From:  patient      History:      Past Medical History:   Diagnosis Date    [facility] [address]     Diabetes ([facility] [address]     GERD (gastroesophageal reflux disease)     Hyperlipidemia     Hypertension     Sleep difficulties      Past Surgical History:   Procedure Laterality Date    APPENDECTOMY      BREAST BIOPSY      CYSTOSCOPY N/A 2020    WITH BOTOX BLADDER INJECTIONS (100 UNITS) performed by Martin Pan MD at [address] [place] [address] N/A 2020    INTERSTIM REVISION performed by Martin Pan MD at [facility] [address] Right        Family History  Family History   Problem Relation Age of Onset    Cancer Mother         colon    Cancer Father         colon    Cancer Maternal Aunt         colon    Cancer Maternal Grandmother         colon     [] Unable to obtain due to ventilated and/ or neurologic status    Social History     Socioeconomic History    Marital status:       Spouse name: Not on file    Number of children: times per day    enoxaparin  40 mg Subcutaneous Daily    insulin lispro  0-6 Units Subcutaneous TID     insulin lispro  0-3 Units Subcutaneous Nightly    [START ON 10/7/2020] influenza virus vaccine  0.5 mL Intramuscular Once    amLODIPine  5 mg Oral Daily    metoprolol tartrate  50 mg Oral BID    gabapentin  300 mg Oral TID    pravastatin  20 mg Oral Daily    traZODone  50 mg Oral Nightly     Continuous Infusions:   sodium chloride 100 mL/hr at 10/06/20 0457    sodium chloride 75 mL/hr at 10/06/20 1021    dextrose         Recent Labs     10/05/20  2230   WBC 10.3   HGB 9.6*   HCT 29.5*   MCV 75.6*   *     Recent Labs     10/05/20  2230      K 3.5      CO2 22   BUN 19   CREATININE 0.70     Recent Labs     10/05/20  2230   AST 24   ALT 16   BILITOT <0.2   ALKPHOS 74     No results for input(s): LIPASE, AMYLASE in the last 72 hours. Recent Labs     10/05/20  2230   PROT 7.3     Xr Acute Abd Series Chest 1 Vw    Result Date: 10/6/2020  XR ACUTE ABD SERIES CHEST 1 VW : 10/6/2020 CLINICAL HISTORY:  abd pain . COMPARISON: None available. TECHNIQUE: An upright PA radiograph of the chest, and upright and supine radiographs of the abdomen and pelvis were obtained. FINDINGS: A moderate amount of stool is noted throughout the colon. Mild scattered small bowel gas is present suggestive of an ileus. A moderate to large-sized hiatal hernia is present. Shallow inspiratory volumes are noted, without significant infiltrate, evidence of free air under the hemidiaphragms, or other acute findings identified. A sacral stimulator lead is noted. PROBABLE MILD ILEUS. OTHER FINDINGS, AS NOTED. Ct Head Wo Contrast    Result Date: 10/6/2020  CT HEAD WO CONTRAST : 10/6/2020 12:09 AM CLINICAL HISTORY:  dizziness . COMPARISON: None available. TECHNIQUE: Spiral unenhanced images were obtained of the head, with routine multiplanar reconstructions performed.  All CT scans at this facility use dose small  hypodensities within the otherwise normal-appearing left kidney are probably cysts. The liver, gallbladder, pancreas, spleen, adrenal glands, right kidney, small bowel loops, urinary bladder, uterus, adnexa, and additional images of pelvis are unremarkable. Mild to moderate atherosclerotic plaquing of a normal caliber abdominal aorta and branch vessels is present. Minimal probable atelectasis is present of the visualized lung bases. Moderately extensive degenerative changes of the thoracolumbar spine are noted. LARGE VOLUME OF STOOL WITHIN THE COLON, WITH MODERATELY EXTENSIVE DIVERTICULOSIS. MILD INFLAMMATION AROUND THE TRANSVERSE, DESCENDING AND SIGMOID COLON SUGGESTING STERCORAL COLITIS OR MILD UNCOMPLICATED DIVERTICULITIS. 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 MODERATE TO LARGE-SIZED HIATAL HERNIA. Impression:   79-year-old female admitted with recent onset change in bowel habits with some distention and CT of the abdomen showed retained stool in the colon with diverticulosis and some inflammation around the transverse descending and sigmoid colon, possible stricture in the sigmoid colon either inflammatory stricture or malignancy, since patient has been tolerating clear liquid diet and has been having liquid stools, no signs of any significant bowel obstruction. Patient has no leukocytosis but has microcytic anemia. Plan: Will try to prep the patient with GoLYTELY, probably would need 2 days of prep for adequate cleansing of the colon. If patient develops increasing abdominal distention and vomiting may have to hold of the oral prep, will plan for colonoscopy on Thursday, will check CEA level  Comments: Thank you for allowing us to participate in the care of this patient. Will continue to follow. Please call if questions or concerns arise.     Electronically signed by Terrance Ruelas MD on 10/6/2020 at 5:53 PM

## 2020-10-06 NOTE — ED TRIAGE NOTES
Pt arrived via ems from home with c/o \"not feeling well' since Botox injections in bladder on September 24th. Pt reports constipated. Having \"small turds\". Pt also reports mild burning with urination. pt a/o x 3 skin pink w/d. resp non labored.

## 2020-10-06 NOTE — ED NOTES
Pt stable, a&ox4, skin w/d/pink, 0 distress, 0 c/o, will monitor.      Xander España RN  10/06/20 0021

## 2020-10-06 NOTE — ED NOTES
Bed: 17  Expected date:   Expected time:   Means of arrival:   Comments:  85F light head since surgery for bladder sept 24   VSS   171BG +IV      Grisel Chua RN  10/05/20 8502

## 2020-10-06 NOTE — ED NOTES
Pt's iv line infiltrated , pt upset about it, pt states:everyting was ok till you put this in\". Pt seems confused for a moment, venus lang at bedside to assess pt.      Canelo Morton RN  10/06/20 0001

## 2020-10-06 NOTE — ED NOTES
Pt stable, a&ox4, skin w/d/pink, pulses palp, 0 distress, calm, cooperative, starting suds enema.      Bryan Mendenhall RN  10/06/20 0486

## 2020-10-06 NOTE — PROGRESS NOTES
Physical Therapy Med Surg Initial Assessment  Facility/Department: [facility]  Room: [facility]       NAME: Jarred Frazier  : 1935 (40 y.o.)  MRN: [id number]  CODE STATUS: Full Code    Date of Service: 10/6/2020    Patient Diagnosis(es): Colonic obstruction [facility]) [K56.609]  Abdominal pain [R10.9]   Chief Complaint   Patient presents with    Other     not feeling well since bladder surgery . botox injection     Patient Active Problem List    Diagnosis Date Noted    Colonic obstruction ([facility] [address] 10/06/2020    Abdominal pain 10/06/2020    Urge urinary incontinence         Past Medical History:   Diagnosis Date    Diabetes ([facility] [address]     Diabetes ([facility] [address]     GERD (gastroesophageal reflux disease)     Hyperlipidemia     Hypertension     Sleep difficulties      Past Surgical History:   Procedure Laterality Date    APPENDECTOMY      BREAST BIOPSY      CYSTOSCOPY N/A 2020    WITH BOTOX BLADDER INJECTIONS (100 UNITS) performed by Samanta Rodriguez MD at [address] [place] [address] N/A 2020    INTERSTIM REVISION performed by Samanta Rodriguez MD at [place] [address] [address]        Chart Reviewed: Yes  Family / Caregiver Present: No    Restrictions:  Restrictions/Precautions: Fall Risk     SUBJECTIVE:      Pain  Pre Treatment Pain Screening  Pain at present: 0    Post Treatment Pain Screening:   Pain Assessment  Pain Level: 0    Prior Level of Function:  Social/Functional History  Lives With: Alone  Type of Home: Apartment  Home Layout: One level  Home Access: Elevator(long ellison)  Home Equipment: Cane(rollator)  ADL Assistance: Independent  Homemaking Assistance: Independent  Ambulation Assistance: Independent(no device)  Transfer Assistance: Independent  Active : Yes    OBJECTIVE:   Vision: Impaired  Vision Exceptions: Wears glasses at all times  Hearing: Within functional limits    Cognition:  Overall Orientation Status: Within Functional Limits  Follows Commands: Within Functional Limits         ROM:  RLE PROM: WFL  LLE PROM: WFL    Strength:  Strength RLE  Strength RLE: WFL  Strength LLE  Strength LLE: WFL    Neuro:  Balance  Posture: Good  Sitting - Static: Good  Sitting - Dynamic: Good  Standing - Static: Good  Standing - Dynamic: Good             Bed mobility  Bridging: Independent  Rolling to Left: Independent  Rolling to Right: Independent  Supine to Sit: Modified independent(uses bed edge for support to pull up on)  Sit to Supine: Independent  Scooting: Independent    Transfers  Sit to Stand: Independent  Stand to sit: Independent  Bed to Chair: Independent    Ambulation  Ambulation?: Yes  Ambulation 1  Surface: level tile  Device: No Device  Assistance: Modified Independent  Quality of Gait: good safety and quality  Gait Deviations: Slow Amara  Distance: 100 feet in room plus multiple short distances    Stairs/Curb  Stairs?: No         Activity Tolerance  Activity Tolerance: Patient Tolerated treatment well          PT Education  PT Education: General Safety  Patient Education: good judgement    ASSESSMENT:   Decision Making: Low Complexity  History: low  Exam: low  Clinical Presentation: low  No Skilled PT: Safe to return home    Patient Education: good judgement  Barriers to Learning: none    DISCHARGE RECOMMENDATIONS:  No Skilled PT: Safe to return home    Assessment: Pt demonstrates indep level of function at this time. No need for PT at this time  REQUIRES PT FOLLOW UP: No      PLAN OF CARE:  Safety Devices  Type of devices: Bed alarm in place, Left in bed    AMPAC (6 CLICK) BASIC MOBILITY  AM-PAC Inpatient Mobility Raw Score : 23     Therapy Time:   Individual   Time In 1053   Time Out 1107   Minutes [address], 10/06/20 at 2:55 PM         Definitions for assistance levels  Independent = pt does not require any physical supervision or assistance from another person for activity completion.  Device may be needed.   Stand by assistance = pt requires verbal cues or instructions from another person, close to but not touching, to perform the activity  Minimal assistance= pt performs 75% or more of the activity; assistance is required to complete the activity  Moderate assistance= pt performs 50% of the activity; assistance is required to complete the activity  Maximal assistance = pt performs 25% of the activity; assistance is required to complete the activity  Dependent = pt requires total physical assistance to accomplish the task

## 2020-10-06 NOTE — H&P
Department of Internal Medicine  General Internal Medicine  Attending History and Physical      CHIEF COMPLAINT:  Abd discomfort    Reason for Admission:  Colon obstruction    History Obtained From:  patient    HISTORY OF PRESENT ILLNESS:      The patient is a 82 y.o. female with significant past medical history of HTN, HLD, DM who presents with multiple complaints including fatigue, abd distension and discomfort, constipation which all started after bladder procedure 2 weeks ago on 9/24. States that she had one very small BM since then, but nothing else. Denies fevers, chills, cough, congestion, sob, cp, palpitations, abd pain, n/v/d, changes in urination or appetite. ED workup showed CT with results of colonic inflammatory obstruction concerning for malignancy and possible uncomplicated diverticulitis. Labs and vitals were unremarkable. Pt states last colonoscopy was over 10 years ago and she was told at that time that she no longer needs them. Pt admitted and surgery and GI consulted for recs. Since arriving to the floor, pt states she had a large BM and her stomach feels better.     Past Medical History:        Diagnosis Date    [facility] [address]     Diabetes ([facility] [address]     GERD (gastroesophageal reflux disease)     Hyperlipidemia     Hypertension     Sleep difficulties      Past Surgical History:        Procedure Laterality Date    APPENDECTOMY      BREAST BIOPSY      CYSTOSCOPY N/A 9/24/2020    WITH BOTOX BLADDER INJECTIONS (100 UNITS) performed by Charisma Childs MD at [address] [place] [address] 9/24/2020    INTERSTIM REVISION performed by Charisma Childs MD at [facility] [address]          Medications Prior to Admission:    Medications Prior to Admission: ibuprofen (ADVIL;MOTRIN) 800 MG tablet, Take 1 tablet by mouth every 6 hours as needed for Pain  acetaminophen (APAP EXTRA STRENGTH) 500 MG tablet, Take 2 tablets by mouth every 6 hours as needed for Pain  cephALEXin (KEFLEX) 500 MG capsule, Take 1 capsule by mouth 4 times daily    Allergies:  Actos [pioglitazone]; Codeine; and Keflex [cephalexin]    Social History:   Never smoker, occ wine with dinner, no drugs    Family History:       Problem Relation Age of Onset    Cancer Mother         colon    Cancer Father         colon    Cancer Maternal Aunt         colon    Cancer Maternal Grandmother         colon     REVIEW OF SYSTEMS:  12 point ROS was negative unless otherwise noted in the HPI   PHYSICAL EXAM:    Vitals:  BP (!) 156/78   Pulse 80   Temp 98.2 °F (36.8 °C) (Oral)   Resp 19   Ht 4' 9\" (1.448 m)   Wt 146 lb (66.2 kg)   SpO2 98%   BMI 31.59 kg/m²     Constitutional: Awake and alert in no acute distress.  Lying in bed comfortably  Head: Normocephalic, atraumatic  Eyes: EOMI, PERRLA  ENT: moist mucous membranes  Neck: neck supple, trachea midline  Lungs: Good inspiratory effort, CTABL, no wheeze, no rhonchi, no rales  Heart: RRR, normal S1 and S2, no murmurs  GI: Soft, non tender, no guarding, no rebound, +BS  MSK: no edema noted  Skin: warm, dry  Psych: appropriate affect       DATA:  CBC:   Lab Results   Component Value Date    WBC 10.3 10/05/2020    RBC 3.90 10/05/2020    RBC 3.57 03/20/2012    HGB 9.6 10/05/2020    HCT 29.5 10/05/2020    MCV 75.6 10/05/2020    MCH 24.7 10/05/2020    MCHC 32.7 10/05/2020    RDW 17.5 10/05/2020     10/05/2020    MPV 7.7 02/19/2014     CMP:    Lab Results   Component Value Date     10/05/2020    K 3.5 10/05/2020     10/05/2020    CO2 22 10/05/2020    BUN 19 10/05/2020    CREATININE 0.70 10/05/2020    GFRAA >60.0 10/05/2020    LABGLOM >60.0 10/05/2020    GLUCOSE 156 10/05/2020    GLUCOSE 143 03/20/2012    PROT 7.3 10/05/2020    LABALBU 4.0 10/05/2020    CALCIUM 9.8 10/05/2020    BILITOT <0.2 10/05/2020    ALKPHOS 74 10/05/2020    AST 24 10/05/2020    ALT 16 10/05/2020     ASSESSMENT AND PLAN:      # Colonic obstruction  - concern for

## 2020-10-06 NOTE — ED PROVIDER NOTES
[facility] ED  EMERGENCY DEPARTMENT ENCOUNTER      Pt Name: Matt Hurtado  MRN: [id number]  [place] 1935  Date of evaluation: 10/5/2020  Provider: Germaine Rodriguez MD    CHIEF COMPLAINT       Chief Complaint   Patient presents with    Other     not feeling well since bladder surgery sept 24th. botox injection         HISTORY OF PRESENT ILLNESS   (Location/Symptom, Timing/Onset, Context/Setting, Quality, Duration, Modifying Factors, Severity)  Note limiting factors. Matt Hurtado is a 82 y.o. female who presents to the emergency department with a complaint of feeling weak with intermittent bouts of dizziness with ambulation. Patient states that she had a procedure done on September 24. Patient states that since then she has felt tired. Patient states she is also not had a bowel movement almost since. Patient was started on sennoside yesterday but only produced 1 very small bowel movement. Patient denies abdominal pain however she does complain of distention. Patient denies nausea or vomiting. Patient's last meal was about 2 hours prior to my exam.  Patient denies bilateral lower extremity edema. Patient denies chest pain, shortness of breath. Patient denies fever, diarrhea, nausea, vomiting. HPI    Nursing Notes were reviewed. REVIEW OF SYSTEMS    (2-9 systems for level 4, 10 or more for level 5)     Review of Systems   Constitutional: Negative for activity change, chills, fatigue and fever. HENT: Negative for congestion, hearing loss, rhinorrhea, sinus pain, sneezing and tinnitus. Eyes: Negative for discharge, redness and visual disturbance. Respiratory: Negative for cough, chest tightness and shortness of breath. Cardiovascular: Negative for chest pain and leg swelling. Gastrointestinal: Positive for abdominal distention. Negative for abdominal pain, diarrhea, nausea and vomiting. Endocrine: Negative for heat intolerance, polydipsia and polyuria. Genitourinary: Negative for dysuria, flank pain, hematuria and urgency. Musculoskeletal: Negative for back pain, joint swelling and myalgias. Skin: Negative for color change, rash and wound. Allergic/Immunologic: Negative for immunocompromised state. Neurological: Positive for dizziness and weakness. Negative for tremors, seizures, syncope, light-headedness and headaches. Hematological: Does not bruise/bleed easily. Psychiatric/Behavioral: Negative for agitation and behavioral problems. The patient is not nervous/anxious. Except as noted above the remainder of the review of systems was reviewed and negative. PAST MEDICAL HISTORY     Past Medical History:   Diagnosis Date    [facility] ([facility] [address]     Diabetes ([facility] [address]     GERD (gastroesophageal reflux disease)     Hyperlipidemia     Hypertension     Sleep difficulties          SURGICAL HISTORY       Past Surgical History:   Procedure Laterality Date    APPENDECTOMY      BREAST BIOPSY      CYSTOSCOPY N/A 9/24/2020    WITH BOTOX BLADDER INJECTIONS (100 UNITS) performed by Nba Urbano MD at [address] [place] [address] N/A 9/24/2020    INTERSTIM REVISION performed by Nba Urbano MD at [facility] [address]          CURRENT MEDICATIONS       Previous Medications    ACETAMINOPHEN (APAP EXTRA STRENGTH) 500 MG TABLET    Take 2 tablets by mouth every 6 hours as needed for Pain    CEPHALEXIN (KEFLEX) 500 MG CAPSULE    Take 1 capsule by mouth 4 times daily    IBUPROFEN (ADVIL;MOTRIN) 800 MG TABLET    Take 1 tablet by mouth every 6 hours as needed for Pain       ALLERGIES     Actos [pioglitazone];  Codeine; and Keflex [cephalexin]    FAMILY HISTORY       Family History   Problem Relation Age of Onset    Cancer Mother         colon    Cancer Father         colon    Cancer Maternal Aunt         colon    Cancer Maternal Grandmother         colon          SOCIAL HISTORY       Social History Result Value    Glucose 156 (*)     All other components within normal limits   CBC WITH AUTO DIFFERENTIAL - Abnormal; Notable for the following components:    RBC 3.90 (*)     Hemoglobin 9.6 (*)     Hematocrit 29.5 (*)     MCV 75.6 (*)     MCH 24.7 (*)     MCHC 32.7 (*)     RDW 17.5 (*)     Platelets 690 (*)     Neutrophils Absolute 7.2 (*)     All other components within normal limits   POCT GLUCOSE - Abnormal; Notable for the following components:    POC Glucose 173 (*)     All other components within normal limits   TROPONIN   URINE RT REFLEX TO CULTURE       All other labs were within normal range or not returned as of this dictation. EMERGENCY DEPARTMENT COURSE and DIFFERENTIAL DIAGNOSIS/MDM:   Vitals:    Vitals:    10/06/20 0027 10/06/20 0030 10/06/20 0327 10/06/20 0457   BP: (!) 159/66 (!) 174/71 (!) 150/69 (!) 165/106   Pulse: 70 69 84 80   Resp: 17 16 20 20   Temp:       TempSrc:       SpO2: 97% 95% 99% 99%   Weight:           85-year of age female who presents with dizziness, abdominal distention, general weakness since a procedure on 9/24. Patient will be given a fluid bolus. CBC, CMP, troponin, urine reflex to culture, CT head without contrast, acute abdominal series, and twelve-lead EKG will all be obtained. Patient will be reassessed. MDM  Number of Diagnoses or Management Options  Colonic obstruction [facility]):   Generalized abdominal pain:         REASSESSMENT      On reassessment patient becoming increasingly uncomfortable. Acute abdominal series notable for constipation. Soapsuds enema ordered and attempted x2 however patient was unable to hold it and and only small amounts of feces come out. Patient then given mag citrate. Patient wishes to stay until her abdominal pain and distention is relieved with a bowel movement. Patient be reassessed again.     Kel Adams MD - On my re-evaluation of the patient she complains of diffuse abdominal cramping pain and appears to be mildly distended. She is not vomiting. She has been unable to have any significant stool in the emergency department but is passing gas. I ordered CT imaging with IV contrast which shows hiatal hernia, large colonic stool burden with concern for possible stercoral proctitis, abrupt narrowing in the distal sigmoid colon which may represent stricture versus underlying mass resulting in colonic obstruction. No small bowel obstruction. No free air. I feel patient will require admission for likely surgical/GI consultation. She has no peritoneal signs on examination. Hospitalist paged for admission. CONSULTS:  None    PROCEDURES:  Unless otherwise noted below, none     Procedures        FINAL IMPRESSION      1. Generalized abdominal pain    2. Colonic obstruction [facility])          DISPOSITION/PLAN   DISPOSITION Decision To Admit 10/06/2020 04:43:14 AM      PATIENT REFERRED TO:  No follow-up provider specified. DISCHARGE MEDICATIONS:  New Prescriptions    No medications on file     Controlled Substances Monitoring:     No flowsheet data found.     (Please note that portions of this note were completed with a voice recognition program.  Efforts were made to edit the dictations but occasionally words are mis-transcribed.)    Mike Gaston MD (electronically signed)             Timoteo Gorman MD  10/06/20 0937

## 2020-10-06 NOTE — CARE COORDINATION
[facility] AT [place] Case Management Initial Discharge Assessment    Met with Patient to discuss discharge plan. PCP: Brady Mars MD                                Date of Last Visit: Current     If no PCP, list provided? N/A    Discharge Planning    Living Arrangements: independently at home    Who do you live with? Alone    Who helps you with your care:  self    If lives at home:     Do you have any barriers navigating in your home? no    Patient can perform ADL? Yes    Current Services (outpatient and in home) :  None    Dialysis: No    Is transportation available to get to your appointments? Yes  - PT drives   DME Equipment:  yes - Pt states she has equip that was her husbands. She does not use but states she would if needed. Respiratory equipment: None    Respiratory provider:  no     Pharmacy:  yes - [facility] in [address] [facility] with Medication Assistance Program?  No      Patient agreeable to [address]? Yes, [address]    Patient agreeable to SNF/Rehab? Declined    Other discharge needs identified? N/A    Freedom of choice list provided with basic dialogue that supports the patient's individualized plan of care/goals and shares the quality data associated with the providers. Yes    Does Patient Have a High-Risk for Readmission Diagnosis (CHF, PN, MI, COPD)? No     The plan for Transition of Care is related to the following treatment goals:Pt will be free from obstruction     Initial Discharge Plan? (Note: please see concurrent daily documentation for any updates after initial note). Plans to return to her apartment and not sure if she will need anything. She states she is independent and able to do her own care. PT states her daughter  around 2 months ago and she is sad about that and helped care for her a long time after a heart transplant that her dtr had.      The Patient   was provided with choice of any post-acute providers for care and equipment and agrees with discharge plan  Yes    Electronically signed by Grace Barry on 10/6/2020 at 1:43 PM

## 2020-10-07 LAB
ANION GAP SERPL CALCULATED.3IONS-SCNC: 12 MEQ/L (ref 9–15)
BUN BLDV-MCNC: 9 MG/DL (ref 8–23)
CALCIUM SERPL-MCNC: 8.8 MG/DL (ref 8.5–9.9)
CHLORIDE BLD-SCNC: 108 MEQ/L (ref 95–107)
CO2: 27 MEQ/L (ref 20–31)
CREAT SERPL-MCNC: 0.55 MG/DL (ref 0.5–0.9)
GFR AFRICAN AMERICAN: >60
GFR NON-AFRICAN AMERICAN: >60
GLUCOSE BLD-MCNC: 110 MG/DL (ref 70–99)
GLUCOSE BLD-MCNC: 125 MG/DL (ref 60–115)
GLUCOSE BLD-MCNC: 128 MG/DL (ref 60–115)
GLUCOSE BLD-MCNC: 139 MG/DL (ref 60–115)
HCT VFR BLD CALC: 27.8 % (ref 37–47)
HEMOGLOBIN: 8.8 G/DL (ref 12–16)
MAGNESIUM: 2.6 MG/DL (ref 1.7–2.4)
MCH RBC QN AUTO: 24.3 PG (ref 27–31.3)
MCHC RBC AUTO-ENTMCNC: 31.6 % (ref 33–37)
MCV RBC AUTO: 77 FL (ref 82–100)
PDW BLD-RTO: 18.1 % (ref 11.5–14.5)
PERFORMED ON: ABNORMAL
PLATELET # BLD: 421 K/UL (ref 130–400)
POTASSIUM REFLEX MAGNESIUM: 3.3 MEQ/L (ref 3.4–4.9)
RBC # BLD: 3.61 M/UL (ref 4.2–5.4)
SODIUM BLD-SCNC: 147 MEQ/L (ref 135–144)
WBC # BLD: 5.5 K/UL (ref 4.8–10.8)

## 2020-10-07 PROCEDURE — 80048 BASIC METABOLIC PNL TOTAL CA: CPT

## 2020-10-07 PROCEDURE — 36415 COLL VENOUS BLD VENIPUNCTURE: CPT

## 2020-10-07 PROCEDURE — 99231 SBSQ HOSP IP/OBS SF/LOW 25: CPT | Performed by: SPECIALIST

## 2020-10-07 PROCEDURE — 83735 ASSAY OF MAGNESIUM: CPT

## 2020-10-07 PROCEDURE — 85027 COMPLETE CBC AUTOMATED: CPT

## 2020-10-07 PROCEDURE — 1210000000 HC MED SURG R&B

## 2020-10-07 PROCEDURE — 2580000003 HC RX 258: Performed by: INTERNAL MEDICINE

## 2020-10-07 PROCEDURE — 6370000000 HC RX 637 (ALT 250 FOR IP): Performed by: INTERNAL MEDICINE

## 2020-10-07 RX ADMIN — SODIUM CHLORIDE: 9 INJECTION, SOLUTION INTRAVENOUS at 14:27

## 2020-10-07 RX ADMIN — GABAPENTIN 300 MG: 300 CAPSULE ORAL at 09:32

## 2020-10-07 RX ADMIN — POTASSIUM CHLORIDE 40 MEQ: 20 TABLET, EXTENDED RELEASE ORAL at 10:24

## 2020-10-07 RX ADMIN — METOPROLOL TARTRATE 50 MG: 50 TABLET, FILM COATED ORAL at 09:33

## 2020-10-07 RX ADMIN — SODIUM CHLORIDE: 9 INJECTION, SOLUTION INTRAVENOUS at 00:43

## 2020-10-07 RX ADMIN — METOPROLOL TARTRATE 50 MG: 50 TABLET, FILM COATED ORAL at 20:46

## 2020-10-07 RX ADMIN — GABAPENTIN 300 MG: 300 CAPSULE ORAL at 20:46

## 2020-10-07 RX ADMIN — GABAPENTIN 300 MG: 300 CAPSULE ORAL at 14:27

## 2020-10-07 RX ADMIN — PRAVASTATIN SODIUM 20 MG: 20 TABLET ORAL at 09:33

## 2020-10-07 RX ADMIN — TRAZODONE HYDROCHLORIDE 50 MG: 50 TABLET ORAL at 20:46

## 2020-10-07 RX ADMIN — AMLODIPINE BESYLATE 5 MG: 5 TABLET ORAL at 09:33

## 2020-10-07 ASSESSMENT — PAIN SCALES - GENERAL: PAINLEVEL_OUTOF10: 0

## 2020-10-07 NOTE — PROGRESS NOTES
Pt had 3 loose BMs after taking Golytely, no cramps, no nausea/vomiting. Pt is resting in bed at this time. Will monitor.

## 2020-10-07 NOTE — PROGRESS NOTES
Department of Internal Medicine  General Internal Medicine  Attending Progress Note      SUBJECTIVE:  Pt seen and examined. Pt did prep for colonoscopy yesterday and had multiple BMs. States plan is for another day of prep and colonoscopy tomorrow.     OBJECTIVE      Medications    Current Facility-Administered Medications: 0.9 % sodium chloride infusion, , Intravenous, Continuous  morphine (PF) injection 2 mg, 2 mg, Intravenous, Q15 Min PRN  sodium chloride flush 0.9 % injection 10 mL, 10 mL, Intravenous, 2 times per day  sodium chloride flush 0.9 % injection 10 mL, 10 mL, Intravenous, PRN  acetaminophen (TYLENOL) tablet 650 mg, 650 mg, Oral, Q6H PRN **OR** acetaminophen (TYLENOL) suppository 650 mg, 650 mg, Rectal, Q6H PRN  polyethylene glycol (GLYCOLAX) packet 17 g, 17 g, Oral, Daily PRN  promethazine (PHENERGAN) tablet 12.5 mg, 12.5 mg, Oral, Q6H PRN **OR** ondansetron (ZOFRAN) injection 4 mg, 4 mg, Intravenous, Q6H PRN  enoxaparin (LOVENOX) injection 40 mg, 40 mg, Subcutaneous, Daily  0.9 % sodium chloride infusion, , Intravenous, Continuous  potassium chloride (KLOR-CON M) extended release tablet 40 mEq, 40 mEq, Oral, PRN **OR** potassium bicarb-citric acid (EFFER-K) effervescent tablet 40 mEq, 40 mEq, Oral, PRN **OR** potassium chloride 10 mEq/100 mL IVPB (Peripheral Line), 10 mEq, Intravenous, PRN  magnesium sulfate 2 g in 50 mL IVPB premix, 2 g, Intravenous, PRN  insulin lispro (HUMALOG) injection vial 0-6 Units, 0-6 Units, Subcutaneous, TID WC  insulin lispro (HUMALOG) injection vial 0-3 Units, 0-3 Units, Subcutaneous, Nightly  glucose (GLUTOSE) 40 % oral gel 15 g, 15 g, Oral, PRN  dextrose 50 % IV solution, 12.5 g, Intravenous, PRN  glucagon (rDNA) injection 1 mg, 1 mg, Intramuscular, PRN  dextrose 5 % solution, 100 mL/hr, Intravenous, PRN  morphine (PF) injection 2 mg, 2 mg, Intravenous, Q2H PRN **OR** morphine injection 4 mg, 4 mg, Intravenous, Q2H PRN  influenza quadrivalent split vaccine (FLUZONE;FLUARIX;FLULAVAL;AFLURIA) injection 0.5 mL, 0.5 mL, Intramuscular, Once  amLODIPine (NORVASC) tablet 5 mg, 5 mg, Oral, Daily  metoprolol tartrate (LOPRESSOR) tablet 50 mg, 50 mg, Oral, BID  gabapentin (NEURONTIN) capsule 300 mg, 300 mg, Oral, TID  pravastatin (PRAVACHOL) tablet 20 mg, 20 mg, Oral, Daily  traZODone (DESYREL) tablet 50 mg, 50 mg, Oral, Nightly  Physical    VITALS:  /67   Pulse 51   Temp 98.2 °F (36.8 °C)   Resp 16   Ht 4' 9\" (1.448 m)   Wt 146 lb (66.2 kg)   SpO2 98%   BMI 31.59 kg/m²   Constitutional: Awake and alert in no acute distress.  Lying in bed comfortably  Head: Normocephalic, atraumatic  Eyes: EOMI, PERRLA  ENT: moist mucous membranes  Neck: neck supple, trachea midline  Lungs: Good inspiratory effort, CTABL, no wheeze, no rhonchi, no rales  Heart: RRR, normal S1 and S2, no murmurs  GI: Soft, non-distended, non tender, no guarding, no rebound, +BS  MSK: Full ROM bilaterally, 5/5 strength bilaterally, no edema noted  Skin: warm, dry  Psych: appropriate affect     Data    CBC:   Lab Results   Component Value Date    WBC 5.5 10/07/2020    RBC 3.61 10/07/2020    RBC 3.57 03/20/2012    HGB 8.8 10/07/2020    HCT 27.8 10/07/2020    MCV 77.0 10/07/2020    MCH 24.3 10/07/2020    MCHC 31.6 10/07/2020    RDW 18.1 10/07/2020     10/07/2020    MPV 7.7 02/19/2014     CMP:    Lab Results   Component Value Date     10/07/2020    K 3.3 10/07/2020     10/07/2020    CO2 27 10/07/2020    BUN 9 10/07/2020    CREATININE 0.55 10/07/2020    GFRAA >60.0 10/07/2020    LABGLOM >60.0 10/07/2020    GLUCOSE 110 10/07/2020    GLUCOSE 143 03/20/2012    PROT 7.3 10/05/2020    LABALBU 4.0 10/05/2020    CALCIUM 8.8 10/07/2020    BILITOT <0.2 10/05/2020    ALKPHOS 74 10/05/2020    AST 24 10/05/2020    ALT 16 10/05/2020       ASSESSMENT AND PLAN      # Colonic obstruction  - concern for malignancy  - afebrile, labs and vitals stable  - NPO, IVF- will advance diet when ok from GI  - GI and surgery consulted - colonoscopy 10/8  - pain, nausea control     # HTN, HLD, DM  - ISS  - cont home meds     DVT: Lovenox     Disposition: Pt was having abd discomfort. CT shows inflammatory stricture concerning for malignancy. GI and surgery consulted for recs. Colonoscopy tomorrow. Surgical plan dependent on results.        Joseph Mcgill, DO  Internal Medicine

## 2020-10-07 NOTE — PROGRESS NOTES
Progress Note  Date:10/7/2020       Room:W480/W480-01  Patient Lidya Parra     YOB: 1935     Age:85 y.o. Subjective    Subjective   Review of Systems  Objective         Vitals Last 24 Hours:  TEMPERATURE:  Temp  Av.4 °F (36.9 °C)  Min: 98.2 °F (36.8 °C)  Max: 98.6 °F (37 °C)  RESPIRATIONS RANGE: Resp  Av  Min: 16  Max: 16  PULSE OXIMETRY RANGE: SpO2  Av %  Min: 98 %  Max: 100 %  PULSE RANGE: Pulse  Av  Min: 51  Max: 97  BLOOD PRESSURE RANGE: Systolic (48BRC), YEF:610 , Min:139 , GVD:778   ; Diastolic (25FUI), VVR:16, Min:67, Max:83    I/O (24Hr): Intake/Output Summary (Last 24 hours) at 10/7/2020 1658  Last data filed at 10/6/2020 1735  Gross per 24 hour   Intake 1150 ml   Output 650 ml   Net 500 ml     Objective  Labs/Imaging/Diagnostics    Labs:  CBC:  Recent Labs     10/05/20  2230 10/07/20  0649   WBC 10.3 5.5   RBC 3.90* 3.61*   HGB 9.6* 8.8*   HCT 29.5* 27.8*   MCV 75.6* 77.0*   RDW 17.5* 18.1*   * 421*     CHEMISTRIES:  Recent Labs     10/05/20  2230 10/07/20  0649    147*   K 3.5 3.3*    108*   CO2 22 27   BUN 19 9   CREATININE 0.70 0.55   GLUCOSE 156* 110*   MG  --  2.6*     PT/INR:No results for input(s): PROTIME, INR in the last 72 hours. APTT:No results for input(s): APTT in the last 72 hours. LIVER PROFILE:  Recent Labs     10/05/20  2230   AST 24   ALT 16   BILITOT <0.2   ALKPHOS 74       Imaging Last 24 Hours:  Xr Acute Abd Series Chest 1 Vw    Result Date: 10/6/2020  XR ACUTE ABD SERIES CHEST 1 VW : 10/6/2020 CLINICAL HISTORY:  abd pain . COMPARISON: None available. TECHNIQUE: An upright PA radiograph of the chest, and upright and supine radiographs of the abdomen and pelvis were obtained. FINDINGS: A moderate amount of stool is noted throughout the colon. Mild scattered small bowel gas is present suggestive of an ileus. A moderate to large-sized hiatal hernia is present.  Shallow inspiratory volumes are noted, without significant infiltrate, evidence of free air under the hemidiaphragms, or other acute findings identified. A sacral stimulator lead is noted. PROBABLE MILD ILEUS. OTHER FINDINGS, AS NOTED. Ct Head Wo Contrast    Result Date: 10/6/2020  CT HEAD WO CONTRAST : 10/6/2020 12:09 AM CLINICAL HISTORY:  dizziness . COMPARISON: None available. TECHNIQUE: Spiral unenhanced images were obtained of the head, with routine multiplanar reconstructions performed. All CT scans at this facility use dose modulation, iterative reconstruction, and/or weight based dosing when appropriate to reduce radiation dose to as low as reasonably achievable. FINDINGS: There is no intracranial hemorrhage, mass effect, midline shift, extra-axial collection, evidence of hydrocephalus, recent ischemic infarct, or skull fracture identified. Mild generalized cerebral volume loss is present, with moderately extensive patchy supratentorial white matter changes most consistent with chronic small vessel ischemic disease. Small probable mucus retention cysts or mucoceles are noted within the left maxillary sinus. The mastoid air cells and other visualized paranasal sinuses are essentially clear. NO ACUTE INTRACRANIAL PROCESS IDENTIFIED. MODERATELY EXTENSIVE SUPRATENTORIAL WHITE MATTER CHANGES, MOST LIKELY CHRONIC SMALL VESSEL ISCHEMIC DISEASE. Ct Abdomen Pelvis W Iv Contrast Additional Contrast? None    Result Date: 10/6/2020  CT ABDOMEN PELVIS W IV CONTRAST: 10/6/2020 CLINICAL HISTORY:  abd pain . COMPARISON: None available. TECHNIQUE: Spiral images were obtained of the abdomen and pelvis after the uneventful intravenous administration of approximately 100 mL of Isovue-370 contrast. All CT scans at this facility use dose modulation, iterative reconstruction, and/or weight based dosing when appropriate to reduce radiation dose to as low as reasonably achievable. FINDINGS: A large amount of stool is present throughout the colon.  Moderately extensive diverticulosis is present of the transverse, descending and sigmoid colon. There is mild to moderate wall thickening and surrounding inflammation suggestive of stercoral colitis or diverticulitis. Collapse with moderate wall thickening of the sigmoid colon is present in the right side of the pelvis, which may be an inflammatory stricture or malignancy. There is no ascites, abscess, lymphadenopathy, or other complication identified. A moderate to large-sized hiatal hernia is present. Two small  hypodensities within the otherwise normal-appearing left kidney are probably cysts. The liver, gallbladder, pancreas, spleen, adrenal glands, right kidney, small bowel loops, urinary bladder, uterus, adnexa, and additional images of pelvis are unremarkable. Mild to moderate atherosclerotic plaquing of a normal caliber abdominal aorta and branch vessels is present. Minimal probable atelectasis is present of the visualized lung bases. Moderately extensive degenerative changes of the thoracolumbar spine are noted. LARGE VOLUME OF STOOL WITHIN THE COLON, WITH MODERATELY EXTENSIVE DIVERTICULOSIS. MILD INFLAMMATION AROUND THE TRANSVERSE, DESCENDING AND SIGMOID COLON SUGGESTING STERCORAL COLITIS OR MILD UNCOMPLICATED DIVERTICULITIS. 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 MODERATE TO LARGE-SIZED HIATAL HERNIA. Assessment//Plan           Hospital Problems           Last Modified POA    Colonic obstruction ([address] 10/6/2020 Yes    Abdominal pain 10/6/2020 Yes        Assessment & Plan  Patient has been tolerating GoLYTELY without any distention or emesis. CEA is 2.2, for colonoscopy a.m.   Electronically signed by Maryanne Pedersen MD on 10/7/20 at 4:58 PM EDT

## 2020-10-07 NOTE — PROGRESS NOTES
Assessment completed. Continued diarrhea from Go Lyte. Denies NV or abdominal pain. Pt drank half of Go Lyte. Plan for colonoscopy tomorrow. No concerns. Will continue to monitor.

## 2020-10-08 ENCOUNTER — ANESTHESIA (OUTPATIENT)
Dept: ENDOSCOPY | Age: 85
DRG: 392 | End: 2020-10-08
Payer: MEDICARE

## 2020-10-08 ENCOUNTER — ANCILLARY PROCEDURE (OUTPATIENT)
Dept: ENDOSCOPY | Age: 85
DRG: 392 | End: 2020-10-08
Payer: MEDICARE

## 2020-10-08 ENCOUNTER — ANESTHESIA EVENT (OUTPATIENT)
Dept: ENDOSCOPY | Age: 85
DRG: 392 | End: 2020-10-08
Payer: MEDICARE

## 2020-10-08 VITALS
OXYGEN SATURATION: 100 % | SYSTOLIC BLOOD PRESSURE: 95 MMHG | DIASTOLIC BLOOD PRESSURE: 52 MMHG | RESPIRATION RATE: 6 BRPM

## 2020-10-08 LAB
ANION GAP SERPL CALCULATED.3IONS-SCNC: 13 MEQ/L (ref 9–15)
BUN BLDV-MCNC: 3 MG/DL (ref 8–23)
CALCIUM SERPL-MCNC: 8.6 MG/DL (ref 8.5–9.9)
CHLORIDE BLD-SCNC: 105 MEQ/L (ref 95–107)
CO2: 24 MEQ/L (ref 20–31)
CREAT SERPL-MCNC: 0.42 MG/DL (ref 0.5–0.9)
GFR AFRICAN AMERICAN: >60
GFR NON-AFRICAN AMERICAN: >60
GLUCOSE BLD-MCNC: 122 MG/DL (ref 70–99)
GLUCOSE BLD-MCNC: 123 MG/DL (ref 60–115)
GLUCOSE BLD-MCNC: 132 MG/DL (ref 60–115)
GLUCOSE BLD-MCNC: 132 MG/DL (ref 60–115)
GLUCOSE BLD-MCNC: 177 MG/DL (ref 60–115)
HCT VFR BLD CALC: 26.7 % (ref 37–47)
HEMOGLOBIN: 8.6 G/DL (ref 12–16)
MAGNESIUM: 2.1 MG/DL (ref 1.7–2.4)
MCH RBC QN AUTO: 24.9 PG (ref 27–31.3)
MCHC RBC AUTO-ENTMCNC: 32.4 % (ref 33–37)
MCV RBC AUTO: 77 FL (ref 82–100)
PDW BLD-RTO: 18.1 % (ref 11.5–14.5)
PERFORMED ON: ABNORMAL
PLATELET # BLD: 395 K/UL (ref 130–400)
POTASSIUM REFLEX MAGNESIUM: 3.5 MEQ/L (ref 3.4–4.9)
RBC # BLD: 3.46 M/UL (ref 4.2–5.4)
SODIUM BLD-SCNC: 142 MEQ/L (ref 135–144)
WBC # BLD: 5.3 K/UL (ref 4.8–10.8)

## 2020-10-08 PROCEDURE — 3700000000 HC ANESTHESIA ATTENDED CARE: Performed by: SPECIALIST

## 2020-10-08 PROCEDURE — 2500000003 HC RX 250 WO HCPCS: Performed by: NURSE ANESTHETIST, CERTIFIED REGISTERED

## 2020-10-08 PROCEDURE — 2580000003 HC RX 258: Performed by: INTERNAL MEDICINE

## 2020-10-08 PROCEDURE — 1210000000 HC MED SURG R&B

## 2020-10-08 PROCEDURE — 2709999900 HC NON-CHARGEABLE SUPPLY: Performed by: SPECIALIST

## 2020-10-08 PROCEDURE — 3609027000 HC COLONOSCOPY: Performed by: SPECIALIST

## 2020-10-08 PROCEDURE — 36415 COLL VENOUS BLD VENIPUNCTURE: CPT

## 2020-10-08 PROCEDURE — 83735 ASSAY OF MAGNESIUM: CPT

## 2020-10-08 PROCEDURE — 7100000010 HC PHASE II RECOVERY - FIRST 15 MIN: Performed by: SPECIALIST

## 2020-10-08 PROCEDURE — 85027 COMPLETE CBC AUTOMATED: CPT

## 2020-10-08 PROCEDURE — 6360000002 HC RX W HCPCS: Performed by: NURSE ANESTHETIST, CERTIFIED REGISTERED

## 2020-10-08 PROCEDURE — 7100000011 HC PHASE II RECOVERY - ADDTL 15 MIN: Performed by: SPECIALIST

## 2020-10-08 PROCEDURE — 2580000003 HC RX 258: Performed by: SPECIALIST

## 2020-10-08 PROCEDURE — 45385 COLONOSCOPY W/LESION REMOVAL: CPT | Performed by: SPECIALIST

## 2020-10-08 PROCEDURE — 6370000000 HC RX 637 (ALT 250 FOR IP): Performed by: INTERNAL MEDICINE

## 2020-10-08 PROCEDURE — 3700000001 HC ADD 15 MINUTES (ANESTHESIA): Performed by: SPECIALIST

## 2020-10-08 PROCEDURE — 0DJD8ZZ INSPECTION OF LOWER INTESTINAL TRACT, VIA NATURAL OR ARTIFICIAL OPENING ENDOSCOPIC: ICD-10-PCS | Performed by: INTERNAL MEDICINE

## 2020-10-08 PROCEDURE — 80048 BASIC METABOLIC PNL TOTAL CA: CPT

## 2020-10-08 PROCEDURE — 97165 OT EVAL LOW COMPLEX 30 MIN: CPT

## 2020-10-08 RX ORDER — MAGNESIUM HYDROXIDE 1200 MG/15ML
LIQUID ORAL PRN
Status: DISCONTINUED | OUTPATIENT
Start: 2020-10-08 | End: 2020-10-08 | Stop reason: ALTCHOICE

## 2020-10-08 RX ORDER — SODIUM CHLORIDE 0.9 % (FLUSH) 0.9 %
10 SYRINGE (ML) INJECTION EVERY 12 HOURS SCHEDULED
Status: DISCONTINUED | OUTPATIENT
Start: 2020-10-08 | End: 2020-10-09 | Stop reason: HOSPADM

## 2020-10-08 RX ORDER — LIDOCAINE HYDROCHLORIDE 20 MG/ML
INJECTION, SOLUTION INFILTRATION; PERINEURAL PRN
Status: DISCONTINUED | OUTPATIENT
Start: 2020-10-08 | End: 2020-10-08 | Stop reason: SDUPTHER

## 2020-10-08 RX ORDER — PROPOFOL 10 MG/ML
INJECTION, EMULSION INTRAVENOUS PRN
Status: DISCONTINUED | OUTPATIENT
Start: 2020-10-08 | End: 2020-10-08 | Stop reason: SDUPTHER

## 2020-10-08 RX ORDER — SODIUM CHLORIDE 0.9 % (FLUSH) 0.9 %
10 SYRINGE (ML) INJECTION PRN
Status: DISCONTINUED | OUTPATIENT
Start: 2020-10-08 | End: 2020-10-09 | Stop reason: HOSPADM

## 2020-10-08 RX ADMIN — GABAPENTIN 300 MG: 300 CAPSULE ORAL at 14:59

## 2020-10-08 RX ADMIN — METOPROLOL TARTRATE 50 MG: 50 TABLET, FILM COATED ORAL at 21:31

## 2020-10-08 RX ADMIN — GABAPENTIN 300 MG: 300 CAPSULE ORAL at 21:31

## 2020-10-08 RX ADMIN — TRAZODONE HYDROCHLORIDE 50 MG: 50 TABLET ORAL at 21:31

## 2020-10-08 RX ADMIN — Medication 10 ML: at 21:31

## 2020-10-08 RX ADMIN — METOPROLOL TARTRATE 50 MG: 50 TABLET, FILM COATED ORAL at 10:29

## 2020-10-08 RX ADMIN — SODIUM CHLORIDE: 9 INJECTION, SOLUTION INTRAVENOUS at 02:01

## 2020-10-08 RX ADMIN — SODIUM CHLORIDE: 9 INJECTION, SOLUTION INTRAVENOUS at 16:05

## 2020-10-08 RX ADMIN — LIDOCAINE HYDROCHLORIDE 60 MG: 20 INJECTION, SOLUTION INFILTRATION; PERINEURAL at 13:17

## 2020-10-08 RX ADMIN — PROPOFOL 200 MG: 10 INJECTION, EMULSION INTRAVENOUS at 13:17

## 2020-10-08 ASSESSMENT — PAIN SCALES - GENERAL
PAINLEVEL_OUTOF10: 0
PAINLEVEL_OUTOF10: 0

## 2020-10-08 ASSESSMENT — PAIN - FUNCTIONAL ASSESSMENT: PAIN_FUNCTIONAL_ASSESSMENT: 0-10

## 2020-10-08 NOTE — PROGRESS NOTES
Assumed care of pt at 0700, assessment complete. No complaints this AM, denies pain. Plan for colonoscopy this afternoon, prep complete, consent signed and in chart. Pt left resting in bed with call light within reach. Will continue to monitor.  Electronically signed by Kimberly Combs RN on 10/8/2020 at 11:29 AM

## 2020-10-08 NOTE — ADT AUTH CERT
Utilization Reviews         Intestinal Obstruction - Care Day 3 (10/8/2020) by Sadaf Aguiar, RN         Review Status  Review Entered    Completed  10/8/2020 15:22        Criteria Review       Care Day: 3 Care Date: 10/8/2020 Level of Care:    Guideline Day 3    Clinical Status    (X) * Hemodynamic stability    10/8/2020 3:22 PM EDT by Jarvis Raines      151/87    (X) * Nausea and vomiting absent or controlled and acceptable for next level of care    (X) * Electrolyte abnormalities absent or acceptable for next level of care    (X) * Oral hydration maintained    (X) * Pain absent or managed    ( ) * Surgery not indicated    10/8/2020 3:22 PM EDT by Jarvis Raines      surgery on consult and waiting on results of colonoscopy to be done today to determine surgery plan    ( ) * Discharge plans and education understood    Activity    (X) * Ambulatory or acceptable for next level of care [C]    Routes    (X) * Oral hydration, medications, and diet    10/8/2020 3:22 PM EDT by Sixto Gutierrez for colonoscopy  lopressor 50mgpo bid  iv ns 75cc/hr    * Milestone    Additional Notes    Care day 3  10/8/20    Patient having colonoscopy this afternoon       Vs     BP (!) 151/87   Pulse 79   Temp 97.9 °F (36.6 °C) (Oral)   Resp 18   Ht 4' 9\" (1.448 m)   Wt 146 lb (66.2 kg)   SpO2 100%   BMI 31.59 kg/m²       Labs    RBC 3.46 M/uL    Hemoglobin 8.6 g/dL    Hematocrit 26.7 %    MCV 77.0 fL    MCH 24.9 pg    MCHC 32.4 %    RDW 18.1 %          Attending impression    Colonic obstruction    - concern for malignancy    - afebrile, labs and vitals stable    - NPO, IVF- will advance diet when ok from GI    - GI and surgery consulted - colonoscopy 10/8    - pain, nausea control         # HTN, HLD, DM    - ISS    - cont home meds         DVT: Lovenox         Disposition: Pt was having abd discomfort. CT shows inflammatory stricture concerning for malignancy. GI and surgery consulted for recs. Colonoscopy today.  Surgical plan dependent on results.         Felipa Richey DO    Internal Medicine          pa recommendation by Saurav Almaraz RN         Review Status  Review Entered    In Primary  10/8/2020 08:19        Criteria Review    We recommend that the following pt's current hospitalization under INPATIENT   status is APPROPRIATE. Name: Elvis Finch   : 1935   CSN: [id number]   INSURANCE: [employer]        Clinical summary 82 y.o. F admitted with colonic obstruction  - concern for malignancy  - NPO   -IVF  - GI and surgery consulted - colonoscopy 10/8  - pain, nausea control ( Pt has required multiple doses of pain meds)  Vitals stable  Labs and Imaging CT abd shows LARGE VOLUME OF STOOL WITHIN THE COLON, WITH  MODERATELY EXTENSIVE DIVERTICULOSIS. MILD INFLAMMATION AROUND THE TRANSVERSE, DESCENDING AND SIGMOID COLON SUGGESTING  STERCORAL COLITIS OR MILD UNCOMPLICATED DIVERTICULITIS.     MCG criteria applies yes  Comments       This chart was reviewed at 3:43 PM 10/7/2020    Jamaica Saleh MD   [address] [facility]   CELL : [phone]

## 2020-10-08 NOTE — PROGRESS NOTES
Head to toe assessment completed. Alert and oriented X4. Denies any SOB or pain. Patient given Golytely and is tolerating well. Will finish rest of Golytely at 0600. Denies any other needs at this time.   Electronically signed by Cesar Multani RN on 10/7/2020 at 10:21 PM

## 2020-10-08 NOTE — PROGRESS NOTES
Department of Internal Medicine  General Internal Medicine  Attending Progress Note      SUBJECTIVE:  Pt seen and examined.  Pt to have colonoscopy today    OBJECTIVE      Medications    Current Facility-Administered Medications: sodium chloride flush 0.9 % injection 10 mL, 10 mL, Intravenous, 2 times per day  sodium chloride flush 0.9 % injection 10 mL, 10 mL, Intravenous, PRN  morphine (PF) injection 2 mg, 2 mg, Intravenous, Q15 Min PRN  sodium chloride flush 0.9 % injection 10 mL, 10 mL, Intravenous, 2 times per day  sodium chloride flush 0.9 % injection 10 mL, 10 mL, Intravenous, PRN  acetaminophen (TYLENOL) tablet 650 mg, 650 mg, Oral, Q6H PRN **OR** acetaminophen (TYLENOL) suppository 650 mg, 650 mg, Rectal, Q6H PRN  polyethylene glycol (GLYCOLAX) packet 17 g, 17 g, Oral, Daily PRN  promethazine (PHENERGAN) tablet 12.5 mg, 12.5 mg, Oral, Q6H PRN **OR** ondansetron (ZOFRAN) injection 4 mg, 4 mg, Intravenous, Q6H PRN  enoxaparin (LOVENOX) injection 40 mg, 40 mg, Subcutaneous, Daily  0.9 % sodium chloride infusion, , Intravenous, Continuous  potassium chloride (KLOR-CON M) extended release tablet 40 mEq, 40 mEq, Oral, PRN **OR** potassium bicarb-citric acid (EFFER-K) effervescent tablet 40 mEq, 40 mEq, Oral, PRN **OR** potassium chloride 10 mEq/100 mL IVPB (Peripheral Line), 10 mEq, Intravenous, PRN  magnesium sulfate 2 g in 50 mL IVPB premix, 2 g, Intravenous, PRN  insulin lispro (HUMALOG) injection vial 0-6 Units, 0-6 Units, Subcutaneous, TID WC  insulin lispro (HUMALOG) injection vial 0-3 Units, 0-3 Units, Subcutaneous, Nightly  glucose (GLUTOSE) 40 % oral gel 15 g, 15 g, Oral, PRN  dextrose 50 % IV solution, 12.5 g, Intravenous, PRN  glucagon (rDNA) injection 1 mg, 1 mg, Intramuscular, PRN  dextrose 5 % solution, 100 mL/hr, Intravenous, PRN  morphine (PF) injection 2 mg, 2 mg, Intravenous, Q2H PRN **OR** morphine injection 4 mg, 4 mg, Intravenous, Q2H PRN  influenza quadrivalent split vaccine (FLUZONE;FLUARIX;FLULAVAL;AFLURIA) injection 0.5 mL, 0.5 mL, Intramuscular, Once  amLODIPine (NORVASC) tablet 5 mg, 5 mg, Oral, Daily  metoprolol tartrate (LOPRESSOR) tablet 50 mg, 50 mg, Oral, BID  gabapentin (NEURONTIN) capsule 300 mg, 300 mg, Oral, TID  pravastatin (PRAVACHOL) tablet 20 mg, 20 mg, Oral, Daily  traZODone (DESYREL) tablet 50 mg, 50 mg, Oral, Nightly  Physical    VITALS:  BP (!) 151/87   Pulse 79   Temp 97.9 °F (36.6 °C) (Oral)   Resp 18   Ht 4' 9\" (1.448 m)   Wt 146 lb (66.2 kg)   SpO2 100%   BMI 31.59 kg/m²   Constitutional: Awake and alert in no acute distress.  Lying in bed comfortably  Head: Normocephalic, atraumatic  Eyes: EOMI, PERRLA  ENT: moist mucous membranes  Neck: neck supple, trachea midline  Lungs: Good inspiratory effort, CTABL, no wheeze, no rhonchi, no rales  Heart: RRR, normal S1 and S2, no murmurs  GI: Soft, non-distended, non tender, no guarding, no rebound, +BS  MSK: Full ROM bilaterally, 5/5 strength bilaterally, no edema noted  Skin: warm, dry  Psych: appropriate affect     Data    CBC:   Lab Results   Component Value Date    WBC 5.3 10/08/2020    RBC 3.46 10/08/2020    RBC 3.57 03/20/2012    HGB 8.6 10/08/2020    HCT 26.7 10/08/2020    MCV 77.0 10/08/2020    MCH 24.9 10/08/2020    MCHC 32.4 10/08/2020    RDW 18.1 10/08/2020     10/08/2020    MPV 7.7 02/19/2014     CMP:    Lab Results   Component Value Date     10/08/2020    K 3.5 10/08/2020     10/08/2020    CO2 24 10/08/2020    BUN 3 10/08/2020    CREATININE 0.42 10/08/2020    GFRAA >60.0 10/08/2020    LABGLOM >60.0 10/08/2020    GLUCOSE 122 10/08/2020    GLUCOSE 143 03/20/2012    PROT 7.3 10/05/2020    LABALBU 4.0 10/05/2020    CALCIUM 8.6 10/08/2020    BILITOT <0.2 10/05/2020    ALKPHOS 74 10/05/2020    AST 24 10/05/2020    ALT 16 10/05/2020       ASSESSMENT AND PLAN      # Colonic obstruction  - concern for malignancy  - afebrile, labs and vitals stable  - NPO, IVF- will advance diet when ok from GI  - GI and surgery consulted - colonoscopy 10/8  - pain, nausea control     # HTN, HLD, DM  - ISS  - cont home meds     DVT: Lovenox     Disposition: Pt was having abd discomfort. CT shows inflammatory stricture concerning for malignancy. GI and surgery consulted for recs. Colonoscopy today. Surgical plan dependent on results.        Nancy House, DO  Internal Medicine

## 2020-10-08 NOTE — PROGRESS NOTES
[facility] OCCUPATIONAL THERAPY EVALUATION - ACUTE     NAME: Marielle Sanchez  : 1935 (82 y.o.)  MRN: [id number]  CODE STATUS: Full Code  [facility]    Date of Service: 10/8/2020    Patient Diagnosis(es): Colonic obstruction [facility]) [K56.609]  Abdominal pain [R10.9]   Chief Complaint   Patient presents with    Other     not feeling well since bladder surgery . botox injection     Patient Active Problem List    Diagnosis Date Noted    Colonic obstruction ([facility] 10/06/2020    Abdominal pain 10/06/2020    Urge urinary incontinence         Past Medical History:   Diagnosis Date    Diabetes ([facility]     Diabetes ([facility]     GERD (gastroesophageal reflux disease)     Hyperlipidemia     Hypertension     Sleep difficulties      Past Surgical History:   Procedure Laterality Date    APPENDECTOMY      BREAST BIOPSY      CYSTOSCOPY N/A 2020    WITH BOTOX BLADDER INJECTIONS (100 UNITS) performed by Adan Noguera MD at [address] [place] [address] N/A 2020    INTERSTIM REVISION performed by Adan Noguera MD at [place] [address] Right         Restrictions  Restrictions/Precautions: Up Ad Elsie     Safety Devices: Safety Devices  Safety Devices in place: Not Applicable        Subjective  Pre Treatment Pain Screening  Pain at present: 0  Scale Used: Numeric Score  Intervention List: Patient able to continue with treatment, Patient declined any intervention    Pain Reassessment:   Pain Assessment  Patient Currently in Pain: No  Pain Assessment: 0-10  Pain Level: 0       Prior Level of Function:  Social/Functional History  Lives With: Alone  Type of Home: Apartment  Home Layout: One level  Home Access: Elevator(long ellison)  Bathroom Shower/Tub: Walk-in shower  Bathroom Equipment: Shower chair  Home Equipment: Cane(rollator)  ADL Assistance: Independent  Homemaking Assistance: Independent  Ambulation Assistance: Independent(no device)  Transfer Assistance: Independent  Active : Yes  Additional Comments: Performs her own ADLs and IADLs. OBJECTIVE:     Orientation Status:  Orientation  Overall Orientation Status: Within Functional Limits    Observation:  Observation/Palpation  Posture: Good  Observation: Pt. alert and attentive, pleasant    Cognition Status:  Cognition  Overall Cognitive Status: WFL    Perception Status:  Perception  Overall Perceptual Status: WFL    Sensation Status:  Sensation  Overall Sensation Status: WNL    Vision and Hearing Status:  Vision  Vision: Impaired(States her vision has been getting worse)  Vision Exceptions: Wears glasses at all times  Hearing  Hearing: Within functional limits     ROM:   LUE AROM (degrees)  LUE AROM : WNL  Left Hand AROM (degrees)  Left Hand AROM: WNL  RUE AROM (degrees)  RUE AROM : WNL  Right Hand AROM (degrees)  Right Hand AROM: WNL    Strength:  LUE Strength  Gross LUE Strength: WFL  L Hand General: 4/5  LUE Strength Comment: 4/5 all planes  RUE Strength  Gross RUE Strength: WFL  R Hand General: 4/5  RUE Strength Comment: 4/5 all planes    Coordination, Tone, Quality of Movement:    Tone RUE  RUE Tone: Normotonic  Tone LUE  LUE Tone: Normotonic  Coordination  Movements Are Fluid And Coordinated: Yes    Hand Dominance:  Hand Dominance  Hand Dominance: Right    ADL Status:  ADL  Feeding: Independent  Grooming: Independent  UE Bathing: Independent  LE Bathing: Independent  UE Dressing: Independent  LE Dressing: Independent  Toileting: Independent  Toilet Transfers  Toilet - Technique: Ambulating  Equipment Used: Standard bedside commode  Toilet Transfer: Independent  Toilet Transfers Comments: Using BSC due to bowel prep, no difficulty       Therapy key for assistance levels -   Independent = Pt. is able to perform task with no assistance but may require a device   Stand by assistance = Pt. does not perform task at an independent level but does not need physical assistance, requires verbal cues  Minimal, Moderate, Maximal Assistance = Pt. requires physical assistance (25%, 50%, 75% assist from helper) for task but is able to actively participate in task   Dependent = Pt. requires total assistance with task and is not able to actively participate with task completion     Functional Mobility:  Functional Mobility  Functional - Mobility Device: No device  Assist Level: Independent  Functional Mobility Comments: G safety  Transfers  Sit to stand: Independent  Stand to sit: Independent    Bed Mobility  Bed mobility  Bridging: Independent  Rolling to Left: Independent  Rolling to Right: Independent  Supine to Sit: Minimal assistance(Pt. pulls from therapist's hand but states this is due to position in bed and that typically she would reposition to get up independently)  Sit to Supine: Independent  Scooting: Independent    Seated and Standing Balance:  Balance  Sitting Balance: Independent  Standing Balance: Independent    Functional Endurance:  Activity Tolerance  Activity Tolerance: Patient Tolerated treatment well    D/C Recommendations:  OT D/C RECOMMENDATIONS  REQUIRES OT FOLLOW UP: No    Equipment Recommendations:  OT Equipment Recommendations  Equipment Needed: No    OT Education:   OT Education  OT Education: OT Role, Plan of Care  Patient Education: Educated pt on role of acute care OT  Barriers to Learning: None    OT Follow Up:  OT D/C RECOMMENDATIONS  REQUIRES OT FOLLOW UP: No       Assessment/Discharge Disposition:  Assessment: Pt. is an 82year old woman from home who presents to [facility] with weakness and bowel issues. Pt. with no functional mobility deficits and performs her ADLs safely. No acute OT indicated.   Prognosis: Good  No Skilled OT: Independent with ADL's, Independent with functional mobility  Decision Making: Low Complexity  History: Pt's medical history is moderately complex  Exam: Pt. has no performance deficits  Assistance / Modification: Pt. requires no assist    Six Click Score How much help for putting on and taking off regular lower body clothing?: None  How much help for Bathing?: None  How much help for Toileting?: None  How much help for putting on and taking off regular upper body clothing?: None  How much help for taking care of personal grooming?: None  How much help for eating meals?: None  AM-[facility] Inpatient Daily Activity Raw Score: 24  AM-[facility] Inpatient ADL T-Scale Score : 57.54  ADL Inpatient CMS 0-100% Score: 0    Plan:  Plan  Times per week: No acute OT    Goals:     Patient Goal: Patient goals : \"I want to get home\"     Discussed and agreed upon: Yes Comments:     Therapy Time:   OT Individual Minutes  Time In: 1033  Time Out: 1042  Minutes: 9    Eval: 9 minutes     Electronically signed by:     ROCK Murry  10/8/2020, 11:03 AM

## 2020-10-08 NOTE — ANESTHESIA PRE PROCEDURE
[place], DO        Or    potassium chloride 10 mEq/100 mL IVPB (Peripheral Line)  10 mEq Intravenous PRN [facility]        magnesium sulfate 2 g in 50 mL IVPB premix  2 g Intravenous PRN [facility] DO        insulin lispro (HUMALOG) injection vial 0-6 Units  0-6 Units Subcutaneous TID WC [facility] DO   1 Units at 10/06/20 1732    insulin lispro (HUMALOG) injection vial 0-3 Units  0-3 Units Subcutaneous Nightly [facility] DO   Stopped at 10/07/20 2146    glucose (GLUTOSE) 40 % oral gel 15 g  15 g Oral PRN [facility] DO        dextrose 50 % IV solution  12.5 g Intravenous PRN [facility] DO        glucagon (rDNA) injection 1 mg  1 mg Intramuscular PRN [facility] DO        dextrose 5 % solution  100 mL/hr Intravenous PRN [facility] DO        morphine (PF) injection 2 mg  2 mg Intravenous Q2H PRN [facility] DO        Or    morphine injection 4 mg  4 mg Intravenous Q2H PRN [facility] DO        influenza quadrivalent split vaccine (FLUZONE;FLUARIX;FLULAVAL;AFLURIA) injection 0.5 mL  0.5 mL Intramuscular Once [facility] DO        amLODIPine (NORVASC) tablet 5 mg  5 mg Oral Daily [facility] DO   5 mg at 10/07/20 0933    metoprolol tartrate (LOPRESSOR) tablet 50 mg  50 mg Oral BID [facility] DO   50 mg at 10/08/20 1029    gabapentin (NEURONTIN) capsule 300 mg  300 mg Oral TID [facility] DO   300 mg at 10/07/20 2046    pravastatin (PRAVACHOL) tablet 20 mg  20 mg Oral Daily [facility] DO   20 mg at 10/07/20 0933    traZODone (DESYREL) tablet 50 mg  50 mg Oral Nightly [facility] DO   50 mg at 10/07/20 2046       Allergies:     Allergies   Allergen Reactions    Actos [Pioglitazone] Diarrhea    Codeine Other (See Comments)     hallucinations    Keflex [Cephalexin]        Problem List:    Patient Active Problem List   Diagnosis Code    Urge urinary incontinence N39.41    Colonic obstruction ([facility] [address] K56.609    Abdominal pain R10.9       Past Medical History:        Diagnosis Date    Diabetes [facility] [address]     Diabetes ([facility] [address]     GERD (gastroesophageal reflux disease)     Hyperlipidemia     Hypertension     Sleep difficulties        Past Surgical History:        Procedure Laterality Date    APPENDECTOMY      BREAST BIOPSY      CYSTOSCOPY N/A 9/24/2020    WITH BOTOX BLADDER INJECTIONS (100 UNITS) performed by Reyes Carpenter MD at [address] [place] [address] N/A 9/24/2020    INTERSTIM REVISION performed by Reyes Carpenter MD at [facility] [address]        Social History:    Social History     Tobacco Use    Smoking status: Former Smoker    Smokeless tobacco: Never Used   Substance Use Topics    Alcohol use: Yes     Comment: occ                                Counseling given: Not Answered      Vital Signs (Current):   Vitals:    10/08/20 0730 10/08/20 0739 10/08/20 1115 10/08/20 1255   BP: (!) 151/87 (!) 151/87 (!) 158/81 (!) 175/85   Pulse: 79 79 84 76   Resp: 20 18 20 20   Temp: 36.6 °C (97.9 °F) 36.6 °C (97.9 °F) 36.5 °C (97.7 °F) 37.4 °C (99.4 °F)   TempSrc: Oral Oral Oral Temporal   SpO2: 100% 100% 100% 97%   Weight:    145 lb (65.8 kg)   Height:    4' 9\" (1.448 m)                                              BP Readings from Last 3 Encounters:   10/08/20 (!) 175/85   09/24/20 (!) 216/120   09/24/20 (!) 177/74       NPO Status: Time of last liquid consumption: 0730                        Time of last solid consumption: 1700                        Date of last liquid consumption: 10/08/20                        Date of last solid food consumption: 10/05/20    BMI:   Wt Readings from Last 3 Encounters:   10/08/20 145 lb (65.8 kg)   09/18/20 143 lb (64.9 kg)   09/14/20 143 lb (64.9 kg)     Body mass index is 31.38 kg/m².     CBC:   Lab Results   Component Value Date    WBC 5.3 10/08/2020    RBC 3.46 10/08/2020    RBC 3.57 03/20/2012    HGB 8.6 10/08/2020    HCT 26.7 10/08/2020    MCV 77.0 10/08/2020    RDW 18.1 10/08/2020     10/08/2020       CMP:   Lab Results   Component Value Date     10/08/2020    K 3.5 10/08/2020     10/08/2020    CO2 24 10/08/2020    BUN 3 10/08/2020    CREATININE 0.42 10/08/2020    GFRAA >60.0 10/08/2020    LABGLOM >60.0 10/08/2020    GLUCOSE 122 10/08/2020    GLUCOSE 143 03/20/2012    PROT 7.3 10/05/2020    CALCIUM 8.6 10/08/2020    BILITOT <0.2 10/05/2020    ALKPHOS 74 10/05/2020    AST 24 10/05/2020    ALT 16 10/05/2020       POC Tests:   Recent Labs     10/08/20  1122   POCGLU 132*       Coags:   Lab Results   Component Value Date    PROTIME 10.4 02/22/2013    PROTIME 10.1 03/20/2012    INR 1.0 02/22/2013       HCG (If Applicable): No results found for: PREGTESTUR, PREGSERUM, HCG, HCGQUANT     ABGs: No results found for: PHART, PO2ART, VJH9PVH, RBX7KJL, BEART, G8RVGXAF     Type & Screen (If Applicable):  No results found for: LABABO, LABRH    Drug/Infectious Status (If Applicable):  No results found for: HIV, HEPCAB    COVID-19 Screening (If Applicable):   Lab Results   Component Value Date    COVID19 Not Detected 09/18/2020         Anesthesia Evaluation    Airway: Mallampati: II  TM distance: >3 FB   Neck ROM: full  Mouth opening: > = 3 FB Dental:          Pulmonary:Negative Pulmonary ROS and normal exam                               Cardiovascular:    (+) hypertension:, hyperlipidemia        Rhythm: regular  Rate: normal                    Neuro/Psych:   Negative Neuro/Psych ROS              GI/Hepatic/Renal:   (+) GERD:, bowel prep,           Endo/Other:    (+) Diabetes, . Abdominal:   (+) obese,         Vascular: negative vascular ROS. Anesthesia Plan      MAC     ASA 3       Induction: intravenous. Anesthetic plan and risks discussed with patient. Plan discussed with attending.                   JERMAN Birch CRNA   10/8/2020

## 2020-10-09 VITALS
SYSTOLIC BLOOD PRESSURE: 163 MMHG | HEART RATE: 76 BPM | OXYGEN SATURATION: 99 % | DIASTOLIC BLOOD PRESSURE: 67 MMHG | HEIGHT: 57 IN | TEMPERATURE: 98.2 F | RESPIRATION RATE: 16 BRPM | WEIGHT: 145 LBS | BODY MASS INDEX: 31.28 KG/M2

## 2020-10-09 LAB
ANION GAP SERPL CALCULATED.3IONS-SCNC: 11 MEQ/L (ref 9–15)
BUN BLDV-MCNC: 3 MG/DL (ref 8–23)
CALCIUM SERPL-MCNC: 8.9 MG/DL (ref 8.5–9.9)
CHLORIDE BLD-SCNC: 108 MEQ/L (ref 95–107)
CO2: 24 MEQ/L (ref 20–31)
CREAT SERPL-MCNC: 0.53 MG/DL (ref 0.5–0.9)
GFR AFRICAN AMERICAN: >60
GFR NON-AFRICAN AMERICAN: >60
GLUCOSE BLD-MCNC: 107 MG/DL (ref 70–99)
GLUCOSE BLD-MCNC: 143 MG/DL (ref 60–115)
GLUCOSE BLD-MCNC: 155 MG/DL (ref 60–115)
HCT VFR BLD CALC: 29.9 % (ref 37–47)
HEMOGLOBIN: 9.2 G/DL (ref 12–16)
MAGNESIUM: 2 MG/DL (ref 1.7–2.4)
MCH RBC QN AUTO: 24.3 PG (ref 27–31.3)
MCHC RBC AUTO-ENTMCNC: 30.8 % (ref 33–37)
MCV RBC AUTO: 79 FL (ref 82–100)
PDW BLD-RTO: 18.8 % (ref 11.5–14.5)
PERFORMED ON: ABNORMAL
PERFORMED ON: ABNORMAL
PLATELET # BLD: 389 K/UL (ref 130–400)
POTASSIUM REFLEX MAGNESIUM: 3.5 MEQ/L (ref 3.4–4.9)
RBC # BLD: 3.78 M/UL (ref 4.2–5.4)
SODIUM BLD-SCNC: 143 MEQ/L (ref 135–144)
WBC # BLD: 4.8 K/UL (ref 4.8–10.8)

## 2020-10-09 PROCEDURE — 6360000002 HC RX W HCPCS: Performed by: INTERNAL MEDICINE

## 2020-10-09 PROCEDURE — G0008 ADMIN INFLUENZA VIRUS VAC: HCPCS | Performed by: INTERNAL MEDICINE

## 2020-10-09 PROCEDURE — 6370000000 HC RX 637 (ALT 250 FOR IP): Performed by: INTERNAL MEDICINE

## 2020-10-09 PROCEDURE — 85027 COMPLETE CBC AUTOMATED: CPT

## 2020-10-09 PROCEDURE — 2580000003 HC RX 258: Performed by: INTERNAL MEDICINE

## 2020-10-09 PROCEDURE — 36415 COLL VENOUS BLD VENIPUNCTURE: CPT

## 2020-10-09 PROCEDURE — 80048 BASIC METABOLIC PNL TOTAL CA: CPT

## 2020-10-09 PROCEDURE — 90686 IIV4 VACC NO PRSV 0.5 ML IM: CPT | Performed by: INTERNAL MEDICINE

## 2020-10-09 PROCEDURE — 83735 ASSAY OF MAGNESIUM: CPT

## 2020-10-09 PROCEDURE — 2580000003 HC RX 258: Performed by: SPECIALIST

## 2020-10-09 RX ORDER — POLYETHYLENE GLYCOL 3350 17 G/17G
17 POWDER, FOR SOLUTION ORAL DAILY
Qty: 30 EACH | Refills: 2 | Status: SHIPPED | OUTPATIENT
Start: 2020-10-09 | End: 2020-11-08

## 2020-10-09 RX ADMIN — INSULIN LISPRO 1 UNITS: 100 INJECTION, SOLUTION INTRAVENOUS; SUBCUTANEOUS at 17:45

## 2020-10-09 RX ADMIN — ENOXAPARIN SODIUM 40 MG: 40 INJECTION SUBCUTANEOUS at 08:09

## 2020-10-09 RX ADMIN — INFLUENZA A VIRUS A/VICTORIA/2454/2019 IVR-207 (H1N1) ANTIGEN (PROPIOLACTONE INACTIVATED), INFLUENZA A VIRUS A/HONG KONG/2671/2019 IVR-208 (H3N2) ANTIGEN (PROPIOLACTONE INACTIVATED), INFLUENZA B VIRUS B/VICTORIA/705/2018 BVR-11 ANTIGEN (PROPIOLACTONE INACTIVATED), INFLUENZA B VIRUS B/PHUKET/3073/2013 BVR-1B ANTIGEN (PROPIOLACTONE INACTIVATED) 0.5 ML: 15; 15; 15; 15 INJECTION, SUSPENSION INTRAMUSCULAR at 18:29

## 2020-10-09 RX ADMIN — METOPROLOL TARTRATE 50 MG: 50 TABLET, FILM COATED ORAL at 08:08

## 2020-10-09 RX ADMIN — GABAPENTIN 300 MG: 300 CAPSULE ORAL at 14:21

## 2020-10-09 RX ADMIN — ACETAMINOPHEN 650 MG: 325 TABLET, FILM COATED ORAL at 12:39

## 2020-10-09 RX ADMIN — Medication 10 ML: at 08:16

## 2020-10-09 RX ADMIN — AMLODIPINE BESYLATE 5 MG: 5 TABLET ORAL at 08:09

## 2020-10-09 RX ADMIN — INSULIN LISPRO 1 UNITS: 100 INJECTION, SOLUTION INTRAVENOUS; SUBCUTANEOUS at 12:39

## 2020-10-09 RX ADMIN — INSULIN LISPRO 1 UNITS: 100 INJECTION, SOLUTION INTRAVENOUS; SUBCUTANEOUS at 08:14

## 2020-10-09 RX ADMIN — PRAVASTATIN SODIUM 20 MG: 20 TABLET ORAL at 08:09

## 2020-10-09 RX ADMIN — SODIUM CHLORIDE: 9 INJECTION, SOLUTION INTRAVENOUS at 04:12

## 2020-10-09 RX ADMIN — GABAPENTIN 300 MG: 300 CAPSULE ORAL at 08:09

## 2020-10-09 NOTE — PROGRESS NOTES
Colonoscopy report reviewed. No evidence of any obstructing lesions in the colon. No surgical intervention anticipated. She can return back to see me in the office as needed. Please call with any questions.

## 2020-10-09 NOTE — DISCHARGE SUMMARY
Physician Discharge Summary     Patient ID:  Db Rand  [id number]  51 y.o.  1935    Admit date: 10/5/2020    Discharge date : 10/09/20     Admitting Physician: Deepika Porras DO     Discharge Physician: Deepika Porras DO     Admission Diagnoses: Colonic obstruction [facility]) [K56.609]  Abdominal pain [R10.9]    Discharge Diagnoses: Severe sigmoid diverticulosis with constipation    Admission Condition: fair    Discharged Condition: good    Hospital Course: Pt was admitted with abdominal discomfort and bloating/distension. Pt states that she had not had a BM in days. CT abd in the ED showed large stool burden and possible sigmoid stricture which was concerning for malignancy. Pt admitted and GI and surgery were consulted. GI did a two day colonoscopy prep and patient was able to have multiple large BMs and relief of symptoms. Colonoscopy showed severe sigmoid diverticulosis, but no stricture or mass identified. GI recommended high fiber diet and daily miralax and was ok for discharge and patient was discharged home on 10/9 in stable and improved condition. Consults: GI    Discharge Exam:  Constitutional: Awake and alert in no acute distress.  Lying in bed comfortably  Head: Normocephalic, atraumatic  Eyes: EOMI, PERRLA  ENT: moist mucous membranes  Neck: neck supple, trachea midline  Lungs: Good inspiratory effort, CTABL, no wheeze, no rhonchi, no rales  Heart: RRR, normal S1 and S2, no murmurs  GI: Soft, non-distended, non tender, no guarding, no rebound, +BS  MSK: Full ROM bilaterally, 5/5 strength bilaterally, no edema noted  Skin: warm, dry  Psych: appropriate affect    Labs:   Recent Labs     10/07/20  0649 10/08/20  0658 10/09/20  0749   WBC 5.5 5.3 4.8   HGB 8.8* 8.6* 9.2*   HCT 27.8* 26.7* 29.9*   * 395 389     Recent Labs     10/07/20  0649 10/08/20  0658 10/09/20  0749   * 142 143   K 3.3* 3.5 3.5   * 105 108*   CO2 27 24 24   BUN 9 3* 3*   CREATININE 0.55 0.42* 0.53   CALCIUM 8.8 8.6 8.9     No results for input(s): AST, ALT, BILIDIR, BILITOT, ALKPHOS in the last 72 hours. No results for input(s): INR in the last 72 hours. No results for input(s): [place] in the last 72 hours. Urinalysis:   Lab Results   Component Value Date    NITRU POSITIVE 08/25/2020    WBCUA 3-5 08/25/2020    BACTERIA Negative 08/25/2020    RBCUA 0-2 08/25/2020    BLOODU Negative 08/25/2020    SPECGRAV 1.008 08/25/2020    GLUCOSEU Negative 08/25/2020       Radiology:   Most recent    Chest CT      WITH CONTRAST:No results found for this or any previous visit. WITHOUT CONTRAST: No results found for this or any previous visit. CXR      2-view: No results found for this or any previous visit. Portable: No results found for this or any previous visit. Echo No results found for this or any previous visit. Disposition: home    In process/preliminary results:  Outstanding Order Results     No orders found from 9/6/2020 to 10/6/2020. Patient Instructions:   Current Discharge Medication List      START taking these medications    Details   polyethylene glycol (GLYCOLAX) 17 g packet Take 17 g by mouth daily  Qty: 30 each, Refills: 2         CONTINUE these medications which have NOT CHANGED    Details   gabapentin (NEURONTIN) 300 MG capsule Take 300 mg by mouth 3 times daily. amLODIPine (NORVASC) 5 MG tablet Take 5 mg by mouth daily      pravastatin (PRAVACHOL) 20 MG tablet Take 20 mg by mouth daily      bisoprolol (ZEBETA) 10 MG tablet Take 10 mg by mouth daily      metFORMIN (GLUCOPHAGE) 500 MG tablet Take 500 mg by mouth 2 times daily (with meals)      traZODone (DESYREL) 50 MG tablet Take 50 mg by mouth nightly      blood glucose monitor strips 1 strip by Other route Test 3 times a day & as needed for symptoms of irregular blood glucose. Dispense sufficient amount for indicated testing frequency plus additional to accommodate PRN testing needs.       ibuprofen (ADVIL;MOTRIN) 800

## 2020-10-09 NOTE — PROGRESS NOTES
Pt in bed watching TV. Assessment documented. A&O*4, ambulating to [facility] well. Medicated per MAR. VSS, afebrile. No needs at this time. Will continue to monitor. Electronically signed by Jodi Bernabe RN on 10/8/2020 at 9:54 PM

## 2020-10-09 NOTE — FLOWSHEET NOTE
Patient awake, up and about in room. Sitting in the chair since early this morning. Alert and oriented x4. Lungs clear. No edema. Possible discharge home today. Denies any nausea or pain.  Appetite good for breakfast.

## 2020-10-09 NOTE — PROGRESS NOTES
Patient was given discharge instructions with understanding. SL remove with catheter patent. Patient dress and waiting for ride. Kylah Martinez

## 2020-10-09 NOTE — PLAN OF CARE
Problem: Pain:  Goal: Pain level will decrease  Description: Pain level will decrease  Outcome: Ongoing  Goal: Control of acute pain  Description: Control of acute pain  Outcome: Ongoing  Goal: Control of chronic pain  Description: Control of chronic pain  Outcome: Ongoing     Problem: Falls - Risk of:  Goal: Will remain free from falls  Description: Will remain free from falls  Outcome: Ongoing  Goal: Absence of physical injury  Description: Absence of physical injury  Outcome: Ongoing     Problem: IP BALANCE  Goal: LTG - patient will maintain standing balance to allow for completion of daily activities  Outcome: Ongoing

## 2021-04-02 ENCOUNTER — HOSPITAL ENCOUNTER (EMERGENCY)
Age: 86
Discharge: HOME | End: 2021-04-02
Payer: MEDICARE

## 2021-04-02 ENCOUNTER — APPOINTMENT (OUTPATIENT)
Dept: GENERAL RADIOLOGY | Age: 86
End: 2021-04-02
Payer: MEDICARE

## 2021-04-02 VITALS
DIASTOLIC BLOOD PRESSURE: 66 MMHG | BODY MASS INDEX: 29.39 KG/M2 | TEMPERATURE: 98.2 F | HEIGHT: 58 IN | OXYGEN SATURATION: 99 % | RESPIRATION RATE: 22 BRPM | WEIGHT: 140 LBS | HEART RATE: 82 BPM | SYSTOLIC BLOOD PRESSURE: 129 MMHG

## 2021-04-02 DIAGNOSIS — N30.00 ACUTE CYSTITIS WITHOUT HEMATURIA: Primary | ICD-10-CM

## 2021-04-02 LAB
ALBUMIN SERPL-MCNC: 3.8 G/DL (ref 3.5–4.6)
ALP BLD-CCNC: 87 U/L (ref 40–130)
ALT SERPL-CCNC: 37 U/L (ref 0–33)
ANION GAP SERPL CALCULATED.3IONS-SCNC: 12 MEQ/L (ref 9–15)
AST SERPL-CCNC: 27 U/L (ref 0–35)
BACTERIA: ABNORMAL /HPF
BASOPHILS ABSOLUTE: 0.1 K/UL (ref 0–0.2)
BASOPHILS RELATIVE PERCENT: 1.1 %
BILIRUB SERPL-MCNC: <0.2 MG/DL (ref 0.2–0.7)
BILIRUBIN URINE: NEGATIVE
BLOOD, URINE: ABNORMAL
BUN BLDV-MCNC: 13 MG/DL (ref 8–23)
CALCIUM SERPL-MCNC: 9.5 MG/DL (ref 8.5–9.9)
CHLORIDE BLD-SCNC: 103 MEQ/L (ref 95–107)
CHP ED QC CHECK: NORMAL
CLARITY: ABNORMAL
CO2: 25 MEQ/L (ref 20–31)
COLOR: YELLOW
CREAT SERPL-MCNC: 0.96 MG/DL (ref 0.5–0.9)
EKG ATRIAL RATE: 86 BPM
EKG P AXIS: 50 DEGREES
EKG P-R INTERVAL: 142 MS
EKG Q-T INTERVAL: 372 MS
EKG QRS DURATION: 74 MS
EKG QTC CALCULATION (BAZETT): 445 MS
EKG R AXIS: -21 DEGREES
EKG T AXIS: 16 DEGREES
EKG VENTRICULAR RATE: 86 BPM
EOSINOPHILS ABSOLUTE: 0.2 K/UL (ref 0–0.7)
EOSINOPHILS RELATIVE PERCENT: 4.2 %
EPITHELIAL CELLS, UA: ABNORMAL /HPF (ref 0–5)
GFR AFRICAN AMERICAN: >60
GFR NON-AFRICAN AMERICAN: 55.4
GLOBULIN: 2.9 G/DL (ref 2.3–3.5)
GLUCOSE BLD-MCNC: 161 MG/DL
GLUCOSE BLD-MCNC: 161 MG/DL (ref 60–115)
GLUCOSE BLD-MCNC: 168 MG/DL (ref 70–99)
GLUCOSE URINE: NEGATIVE MG/DL
HCT VFR BLD CALC: 31.2 % (ref 37–47)
HEMOGLOBIN: 10.3 G/DL (ref 12–16)
HYALINE CASTS: ABNORMAL /HPF (ref 0–5)
KETONES, URINE: NEGATIVE MG/DL
LACTIC ACID: 2 MMOL/L (ref 0.5–2.2)
LEUKOCYTE ESTERASE, URINE: ABNORMAL
LYMPHOCYTES ABSOLUTE: 1.4 K/UL (ref 1–4.8)
LYMPHOCYTES RELATIVE PERCENT: 25 %
MAGNESIUM: 1.8 MG/DL (ref 1.7–2.4)
MCH RBC QN AUTO: 27.1 PG (ref 27–31.3)
MCHC RBC AUTO-ENTMCNC: 33.1 % (ref 33–37)
MCV RBC AUTO: 81.7 FL (ref 82–100)
MONOCYTES ABSOLUTE: 0.5 K/UL (ref 0.2–0.8)
MONOCYTES RELATIVE PERCENT: 8 %
NEUTROPHILS ABSOLUTE: 3.6 K/UL (ref 1.4–6.5)
NEUTROPHILS RELATIVE PERCENT: 61.7 %
NITRITE, URINE: POSITIVE
PDW BLD-RTO: 21.5 % (ref 11.5–14.5)
PERFORMED ON: ABNORMAL
PH UA: 6 (ref 5–9)
PLATELET # BLD: 453 K/UL (ref 130–400)
POTASSIUM SERPL-SCNC: 4.4 MEQ/L (ref 3.4–4.9)
PROCALCITONIN: 0.1 NG/ML (ref 0–0.15)
PROTEIN UA: 100 MG/DL
RBC # BLD: 3.82 M/UL (ref 4.2–5.4)
RBC UA: >100 /HPF (ref 0–5)
SARS-COV-2, NAAT: NOT DETECTED
SODIUM BLD-SCNC: 140 MEQ/L (ref 135–144)
SPECIFIC GRAVITY UA: 1.01 (ref 1–1.03)
TOTAL PROTEIN: 6.7 G/DL (ref 6.3–8)
TROPONIN: <0.01 NG/ML (ref 0–0.01)
URINE REFLEX TO CULTURE: YES
UROBILINOGEN, URINE: 0.2 E.U./DL
WBC # BLD: 5.8 K/UL (ref 4.8–10.8)
WBC UA: >100 /HPF (ref 0–5)

## 2021-04-02 PROCEDURE — 96366 THER/PROPH/DIAG IV INF ADDON: CPT

## 2021-04-02 PROCEDURE — 83735 ASSAY OF MAGNESIUM: CPT

## 2021-04-02 PROCEDURE — 80053 COMPREHEN METABOLIC PANEL: CPT

## 2021-04-02 PROCEDURE — 87186 SC STD MICRODIL/AGAR DIL: CPT

## 2021-04-02 PROCEDURE — 96365 THER/PROPH/DIAG IV INF INIT: CPT

## 2021-04-02 PROCEDURE — 93005 ELECTROCARDIOGRAM TRACING: CPT | Performed by: PHYSICIAN ASSISTANT

## 2021-04-02 PROCEDURE — 71045 X-RAY EXAM CHEST 1 VIEW: CPT

## 2021-04-02 PROCEDURE — 81001 URINALYSIS AUTO W/SCOPE: CPT

## 2021-04-02 PROCEDURE — 84484 ASSAY OF TROPONIN QUANT: CPT

## 2021-04-02 PROCEDURE — 99285 EMERGENCY DEPT VISIT HI MDM: CPT

## 2021-04-02 PROCEDURE — 6360000002 HC RX W HCPCS: Performed by: PHYSICIAN ASSISTANT

## 2021-04-02 PROCEDURE — 87077 CULTURE AEROBIC IDENTIFY: CPT

## 2021-04-02 PROCEDURE — 85025 COMPLETE CBC W/AUTO DIFF WBC: CPT

## 2021-04-02 PROCEDURE — 2580000003 HC RX 258: Performed by: PHYSICIAN ASSISTANT

## 2021-04-02 PROCEDURE — 83605 ASSAY OF LACTIC ACID: CPT

## 2021-04-02 PROCEDURE — 87086 URINE CULTURE/COLONY COUNT: CPT

## 2021-04-02 PROCEDURE — 87635 SARS-COV-2 COVID-19 AMP PRB: CPT

## 2021-04-02 PROCEDURE — 84145 PROCALCITONIN (PCT): CPT

## 2021-04-02 PROCEDURE — 36415 COLL VENOUS BLD VENIPUNCTURE: CPT

## 2021-04-02 RX ORDER — CIPROFLOXACIN 500 MG/1
500 TABLET, FILM COATED ORAL 2 TIMES DAILY
Qty: 20 TABLET | Refills: 0 | Status: SHIPPED | OUTPATIENT
Start: 2021-04-02 | End: 2021-04-12

## 2021-04-02 RX ORDER — CIPROFLOXACIN 2 MG/ML
400 INJECTION, SOLUTION INTRAVENOUS ONCE
Status: COMPLETED | OUTPATIENT
Start: 2021-04-02 | End: 2021-04-02

## 2021-04-02 RX ORDER — 0.9 % SODIUM CHLORIDE 0.9 %
1000 INTRAVENOUS SOLUTION INTRAVENOUS ONCE
Status: COMPLETED | OUTPATIENT
Start: 2021-04-02 | End: 2021-04-02

## 2021-04-02 RX ADMIN — SODIUM CHLORIDE 1000 ML: 9 INJECTION, SOLUTION INTRAVENOUS at 15:38

## 2021-04-02 RX ADMIN — CIPROFLOXACIN 400 MG: 2 INJECTION, SOLUTION INTRAVENOUS at 17:03

## 2021-04-02 ASSESSMENT — ENCOUNTER SYMPTOMS
VOMITING: 0
BACK PAIN: 0
PHOTOPHOBIA: 0
SORE THROAT: 0
NAUSEA: 0
EYE PAIN: 0
COUGH: 0
RHINORRHEA: 0
DIARRHEA: 0
ABDOMINAL PAIN: 0
SHORTNESS OF BREATH: 0

## 2021-04-02 NOTE — ED NOTES
Bed: 03  Expected date: 4/2/21  Expected time: 3:06 PM  Means of arrival: [facility]  Comments:  83 yo F illness x3 days  150/88 HR 90 97% RA  A&Ox4      Leilani Yang RN  04/02/21 2136

## 2021-04-02 NOTE — ED NOTES
Grand daughter called back and will be here to pick her up     April 322 Paco Carreon RN  04/02/21 [id number]

## 2021-04-02 NOTE — ED PROVIDER NOTES
[address] [facility] ED  eMERGENCY dEPARTMENTeNCOUnter      Pt Name: Dorys Del Castillo  MRN: [id number]  Javadgfherbert 1935  Date ofevaluation: 4/2/2021  Provider: Alonzo Nesbitt PA-C    CHIEF COMPLAINT       Chief Complaint   Patient presents with    Illness     not drinking much, increased weakness         HISTORY OF PRESENT ILLNESS   (Location/Symptom, Timing/Onset,Context/Setting, Quality, Duration, Modifying Factors, Severity)  Note limiting factors. Dorys Del Castillo is a 82 y.o. female who presents to the emergency department general fatigue and weakness the last couple of days. She has decreased appetite. She does have history of frequent UTIs which she is controlled started about. She denies any cough chest pain shortness of breath abdominal pain nausea vomiting. HPI    NursingNotes were reviewed. REVIEW OF SYSTEMS    (2-9 systems for level 4, 10 or more for level 5)     Review of Systems   Constitutional: Positive for fever. Negative for chills, diaphoresis and fatigue. HENT: Negative for congestion, rhinorrhea and sore throat. Eyes: Negative for photophobia and pain. Respiratory: Negative for cough and shortness of breath. Cardiovascular: Negative for chest pain and palpitations. Gastrointestinal: Negative for abdominal pain, diarrhea, nausea and vomiting. Genitourinary: Negative for dysuria and flank pain. Musculoskeletal: Negative for back pain. Skin: Negative for rash. Neurological: Positive for weakness. Negative for dizziness, light-headedness and headaches. Psychiatric/Behavioral: Negative. All other systems reviewed and are negative. Except as noted above the remainder of the review of systems was reviewed and negative.        PAST MEDICAL HISTORY     Past Medical History:   Diagnosis Date    Diabetes ([facility] [address]     Diabetes ([facility] [address]     GERD (gastroesophageal reflux disease)     Hyperlipidemia     Hypertension     Sleep difficulties          SURGICALHISTORY Past Surgical History:   Procedure Laterality Date    APPENDECTOMY      BREAST BIOPSY      COLONOSCOPY      COLONOSCOPY N/A 10/8/2020    COLONOSCOPY DIAGNOSTIC performed by Mckenna Price MD at [facility] N/A 9/24/2020    WITH BOTOX BLADDER INJECTIONS (100 UNITS) performed by Haleigh King MD at [facility] N/A 9/24/2020    INTERSTIM REVISION performed by Haleigh King MD at [facility] [address]          CURRENT MEDICATIONS           ALLERGIES     Actos [pioglitazone], Codeine, and Keflex [cephalexin]    FAMILY HISTORY       Family History   Problem Relation Age of Onset    Cancer Mother         colon    Cancer Father         colon    Cancer Maternal Aunt         colon    Cancer Maternal Grandmother         colon          SOCIAL HISTORY       Social History     Socioeconomic History    Marital status:       Spouse name: None    Number of children: None    Years of education: None    Highest education level: None   Occupational History    None   Social Needs    Financial resource strain: None    Food insecurity     Worry: None     Inability: None    Transportation needs     Medical: None     Non-medical: None   Tobacco Use    Smoking status: Former Smoker    Smokeless tobacco: Never Used   Substance and Sexual Activity    Alcohol use: Yes     Comment: occ    Drug use: No    Sexual activity: Not Currently   Lifestyle    Physical activity     Days per week: None     Minutes per session: None    Stress: None   Relationships    Social connections     Talks on phone: None     Gets together: None     Attends Cheondoism service: None     Active member of club or organization: None     Attends meetings of clubs or organizations: None     Relationship status: None    Intimate partner violence     Fear of current or ex partner: None     Emotionally abused: None     Physically abused: None     Forced 86bpm no acute st changes no ectopy     RADIOLOGY:   Non-plain filmimages such as CT, Ultrasound and MRI are read by the radiologist. Plain radiographic images are visualized and preliminarily interpreted by the emergency physician with the below findings:        Interpretation per the Radiologist below, if available at the time ofthis note:    XR CHEST PORTABLE   Final Result   NO ACUTE CARDIOPULMONARY ABNORMALITY. HIATAL HERNIA. ED BEDSIDE ULTRASOUND:   Performed by ED Physician - none    LABS:  Labs Reviewed   COMPREHENSIVE METABOLIC PANEL - Abnormal; Notable for the following components:       Result Value    Glucose 168 (*)     CREATININE 0.96 (*)     GFR Non- 55.4 (*)     ALT 37 (*)     All other components within normal limits   CBC WITH AUTO DIFFERENTIAL - Abnormal; Notable for the following components:    RBC 3.82 (*)     Hemoglobin 10.3 (*)     Hematocrit 31.2 (*)     MCV 81.7 (*)     RDW 21.5 (*)     Platelets 462 (*)     All other components within normal limits   URINE RT REFLEX TO CULTURE - Abnormal; Notable for the following components:    Clarity, UA TURBID (*)     Blood, Urine LARGE (*)     Protein,  (*)     Nitrite, Urine POSITIVE (*)     Leukocyte Esterase, Urine LARGE (*)     All other components within normal limits   POCT GLUCOSE - Abnormal; Notable for the following components:    POC Glucose 161 (*)     All other components within normal limits   POCT GLUCOSE - Normal   COVID-19, RAPID   MAGNESIUM   TROPONIN   PROCALCITONIN   LACTIC ACID, PLASMA   MICROSCOPIC URINALYSIS       All other labs were within normal range or not returned as of this dictation.     EMERGENCY DEPARTMENT COURSE and DIFFERENTIAL DIAGNOSIS/MDM:   Vitals:    Vitals:    04/02/21 1516 04/02/21 1520 04/02/21 1629   BP:  132/75 129/82   Pulse:  86 79   Resp: 16  16   Temp: 98.2 °F (36.8 °C)     TempSrc: Oral     SpO2:  99% 98%   Weight: 140 lb (63.5 kg)     Height: 4' 10\" (1.473 m)

## 2021-04-02 NOTE — ED NOTES
Assumed care of pt at this time. Pt was assisted to and from the toilet in her room. Pt required little assistance. Urine was obtained and sent via tube system.       Lito Billings RN  04/02/21 5804

## 2021-04-05 LAB
ORGANISM: ABNORMAL
URINE CULTURE, ROUTINE: ABNORMAL

## 2021-04-05 PROCEDURE — 93010 ELECTROCARDIOGRAM REPORT: CPT | Performed by: INTERNAL MEDICINE

## 2021-04-06 ENCOUNTER — OFFICE VISIT (OUTPATIENT)
Dept: OBGYN CLINIC | Age: 86
End: 2021-04-06
Payer: MEDICARE

## 2021-04-06 VITALS
SYSTOLIC BLOOD PRESSURE: 122 MMHG | BODY MASS INDEX: 28.55 KG/M2 | HEART RATE: 88 BPM | WEIGHT: 136 LBS | DIASTOLIC BLOOD PRESSURE: 80 MMHG | HEIGHT: 58 IN

## 2021-04-06 DIAGNOSIS — N39.41 URGE INCONTINENCE OF URINE: Primary | ICD-10-CM

## 2021-04-06 PROCEDURE — 4040F PNEUMOC VAC/ADMIN/RCVD: CPT | Performed by: OBSTETRICS & GYNECOLOGY

## 2021-04-06 PROCEDURE — 0509F URINE INCON PLAN DOCD: CPT | Performed by: OBSTETRICS & GYNECOLOGY

## 2021-04-06 PROCEDURE — 99214 OFFICE O/P EST MOD 30 MIN: CPT | Performed by: OBSTETRICS & GYNECOLOGY

## 2021-04-06 PROCEDURE — 1036F TOBACCO NON-USER: CPT | Performed by: OBSTETRICS & GYNECOLOGY

## 2021-04-06 PROCEDURE — G8427 DOCREV CUR MEDS BY ELIG CLIN: HCPCS | Performed by: OBSTETRICS & GYNECOLOGY

## 2021-04-06 PROCEDURE — 1123F ACP DISCUSS/DSCN MKR DOCD: CPT | Performed by: OBSTETRICS & GYNECOLOGY

## 2021-04-06 PROCEDURE — G8417 CALC BMI ABV UP PARAM F/U: HCPCS | Performed by: OBSTETRICS & GYNECOLOGY

## 2021-04-06 PROCEDURE — 1090F PRES/ABSN URINE INCON ASSESS: CPT | Performed by: OBSTETRICS & GYNECOLOGY

## 2021-04-06 PROCEDURE — G8400 PT W/DXA NO RESULTS DOC: HCPCS | Performed by: OBSTETRICS & GYNECOLOGY

## 2021-04-06 NOTE — PROGRESS NOTES
Elvis Finch is a 82 y.o. female who presents here today for complaints of history of urge incontinence. History of previous InterStim years ago that was successful and have , patient had elected not to repeat the InterStim due to minimizing surgery for which she had received Botox bladder injections which she did not tolerated due to side effects. Patient here today due to recurrence of her urge urinary incontinence symptoms. Patient has tried several medications with no success. Vitals:  /80 (Site: Left Upper Arm, Position: Sitting, Cuff Size: Small Adult)   Pulse 88   Ht 4' 10\" (1.473 m)   Wt 136 lb (61.7 kg)   BMI 28.42 kg/m²   Allergies:  Actos [pioglitazone], Codeine, and Keflex [cephalexin]  Past Medical History:   Diagnosis Date    [facility] [address]     Diabetes ([facility] [address]     GERD (gastroesophageal reflux disease)     Hyperlipidemia     Hypertension     Sleep difficulties      Past Surgical History:   Procedure Laterality Date    APPENDECTOMY      BREAST BIOPSY      COLONOSCOPY      COLONOSCOPY N/A 10/8/2020    COLONOSCOPY DIAGNOSTIC performed by Nancy Pizano MD at [place] [address] N/A 2020    WITH BOTOX BLADDER INJECTIONS (100 UNITS) performed by Romayne Albright, MD at [address] [place] [address] N/A 2020    INTERSTIM REVISION performed by Romayne Albright, MD at [facility] [address]      OB History        5    Para   4    Term   4            AB   1    Living   4       SAB   1    TAB        Ectopic        Molar        Multiple        Live Births                  Family History   Problem Relation Age of Onset    Cancer Mother         colon    Cancer Father         colon    Cancer Maternal Aunt         colon    Cancer Maternal Grandmother         colon     Social History     Socioeconomic History    Marital status:       Spouse name: Not on file    Number of children: Not on neuromodulation, with a subsequent side effects associated with the Botox, patient does not wish to repeat her Botox injections instead did agree for repeat trial of sacral neuromodulation. To be scheduled for PNE  Sacral neuromodulation and PNE counseling    What I s It? A Percutaneous Nerve Evaluation (PNE) is a test done in the office to determine if InterStim therapy  will improve your urinary symptoms. InterStim therapy is an FDA-approved treatment for urinary  urgency, frequency, urge incontinence, and retention. It is a small device that is implanted under the  skin of the upper buttocks. It works by gently stimulating the sacral nerves to help the bladder function  more normally. Percutaneous Nerve Evaluation: For the PNE, a temporary wire will be placed along side the third sacral nerve in your lower back. This  nerve controls bladder function. The wire is the size of a thick hair and will carry gentle electrical  pulses to the nerve. The wire will be inserted by myself Dr Will Treadwell using a local anesthetic. I  will know that the lead is in the correct position when you feel a tapping, pulsing, vibrating or tingling  sensation in the vaginal or rectal area. Once the wire is in the correct position, it will exit the skin and  be connected to a portable stimulator box that can be clipped to the waistband of your clothing. You  will be able to turn the stimulation up, down, on, and off with the portable stimulator. For the next week, you will monitor changes in your symptoms. You will then follow-up in the office  to discuss changes in your urinary symptoms and to have the temporary wire removed. If your  symptoms have improved by at least 50 percent, you will be given the option to proceed with  InterStim placement. This procedure will be scheduled at the next available date convenient for you.   InterStim Placement:  A permanent wire (lead) will be inserted and attached to a small neurostimulator

## 2021-04-29 ENCOUNTER — OFFICE VISIT (OUTPATIENT)
Dept: UROLOGY | Age: 86
End: 2021-04-29
Payer: MEDICARE

## 2021-04-29 VITALS
SYSTOLIC BLOOD PRESSURE: 122 MMHG | WEIGHT: 122 LBS | HEART RATE: 88 BPM | BODY MASS INDEX: 25.61 KG/M2 | DIASTOLIC BLOOD PRESSURE: 72 MMHG | HEIGHT: 58 IN

## 2021-04-29 DIAGNOSIS — R82.81 PYURIA: ICD-10-CM

## 2021-04-29 DIAGNOSIS — R33.9 RETENTION OF URINE: Primary | ICD-10-CM

## 2021-04-29 LAB
BILIRUBIN, POC: ABNORMAL
BLOOD URINE, POC: ABNORMAL
CLARITY, POC: CLEAR
COLOR, POC: YELLOW
GLUCOSE URINE, POC: ABNORMAL
KETONES, POC: ABNORMAL
LEUKOCYTE EST, POC: ABNORMAL
NITRITE, POC: ABNORMAL
PH, POC: 6.5
POST VOID RESIDUAL (PVR): 208 ML
PROTEIN, POC: ABNORMAL
SPECIFIC GRAVITY, POC: 1.01
UROBILINOGEN, POC: 0.2

## 2021-04-29 PROCEDURE — 81003 URINALYSIS AUTO W/O SCOPE: CPT | Performed by: UROLOGY

## 2021-04-29 PROCEDURE — G8417 CALC BMI ABV UP PARAM F/U: HCPCS | Performed by: UROLOGY

## 2021-04-29 PROCEDURE — G8400 PT W/DXA NO RESULTS DOC: HCPCS | Performed by: UROLOGY

## 2021-04-29 PROCEDURE — 4040F PNEUMOC VAC/ADMIN/RCVD: CPT | Performed by: UROLOGY

## 2021-04-29 PROCEDURE — 99203 OFFICE O/P NEW LOW 30 MIN: CPT | Performed by: UROLOGY

## 2021-04-29 PROCEDURE — 1123F ACP DISCUSS/DSCN MKR DOCD: CPT | Performed by: UROLOGY

## 2021-04-29 PROCEDURE — 1090F PRES/ABSN URINE INCON ASSESS: CPT | Performed by: UROLOGY

## 2021-04-29 PROCEDURE — G8427 DOCREV CUR MEDS BY ELIG CLIN: HCPCS | Performed by: UROLOGY

## 2021-04-29 PROCEDURE — 1036F TOBACCO NON-USER: CPT | Performed by: UROLOGY

## 2021-04-29 PROCEDURE — 51798 US URINE CAPACITY MEASURE: CPT | Performed by: UROLOGY

## 2021-04-29 RX ORDER — OXYBUTYNIN CHLORIDE 5 MG/1
5 TABLET ORAL 2 TIMES DAILY
COMMUNITY

## 2021-04-29 RX ORDER — CIPROFLOXACIN 500 MG/1
500 TABLET, FILM COATED ORAL 2 TIMES DAILY
COMMUNITY
End: 2021-04-29

## 2021-05-01 LAB — URINE CULTURE, ROUTINE: NORMAL

## 2022-03-25 NOTE — ANESTHESIA POSTPROCEDURE EVALUATION
Department of Anesthesiology  Postprocedure Note    Patient: Haylee Melton  MRN: [id number]  YOB: 1935  Date of evaluation: 9/24/2020  Time:  10:53 AM     Procedure Summary     Date:  09/24/20 Room / Location:  [facility]    Anesthesia Start:  9249 Anesthesia Stop:  1124    Procedures:       INTERSTIM REVISION (N/A Back)      WITH BOTOX BLADDER INJECTIONS (100 UNITS) (N/A Urethra) Diagnosis:  (URGE INCONTINENCE)    Surgeon:  More Christian MD Responsible Provider:  Santana Munson MD    Anesthesia Type:  general ASA Status:  2          Anesthesia Type: general    Anabell Phase I:      Anabell Phase II:      Last vitals: Reviewed and per EMR flowsheets.        Anesthesia Post Evaluation    Patient location during evaluation: bedside  Patient participation: complete - patient participated  Level of consciousness: awake and awake and alert  Pain score: 0  Airway patency: patent  Nausea & Vomiting: no nausea and no vomiting  Complications: no  Cardiovascular status: blood pressure returned to baseline and hemodynamically stable  Respiratory status: acceptable  Hydration status: euvolemic Report called to Eveline Ortiz RN  03/25/22 4791

## 2024-08-19 ENCOUNTER — APPOINTMENT (OUTPATIENT)
Dept: GENERAL RADIOLOGY | Age: 89
End: 2024-08-19
Payer: MEDICARE

## 2024-08-19 ENCOUNTER — HOSPITAL ENCOUNTER (EMERGENCY)
Age: 89
Discharge: HOME | End: 2024-08-19
Payer: MEDICARE

## 2024-08-19 VITALS
HEIGHT: 58 IN | HEART RATE: 72 BPM | RESPIRATION RATE: 16 BRPM | TEMPERATURE: 98.6 F | DIASTOLIC BLOOD PRESSURE: 75 MMHG | WEIGHT: 122 LBS | BODY MASS INDEX: 25.61 KG/M2 | SYSTOLIC BLOOD PRESSURE: 134 MMHG | OXYGEN SATURATION: 100 %

## 2024-08-19 DIAGNOSIS — Z87.440 HISTORY OF URINARY TRACT INFECTION: ICD-10-CM

## 2024-08-19 DIAGNOSIS — U07.1 COVID-19: Primary | ICD-10-CM

## 2024-08-19 LAB
ALBUMIN SERPL-MCNC: 4.1 G/DL (ref 3.5–4.6)
ALP SERPL-CCNC: 65 U/L (ref 40–130)
ALT SERPL-CCNC: 12 U/L (ref 0–33)
ANION GAP SERPL CALCULATED.3IONS-SCNC: 11 MEQ/L (ref 9–15)
AST SERPL-CCNC: 15 U/L (ref 0–35)
BASOPHILS # BLD: 0 K/UL (ref 0–0.2)
BASOPHILS NFR BLD: 0.9 %
BILIRUB SERPL-MCNC: 0.3 MG/DL (ref 0.2–0.7)
BILIRUB UR QL STRIP: NEGATIVE
BUN SERPL-MCNC: 21 MG/DL (ref 8–23)
CALCIUM SERPL-MCNC: 9.2 MG/DL (ref 8.5–9.9)
CHLORIDE SERPL-SCNC: 103 MEQ/L (ref 95–107)
CLARITY UR: CLEAR
CO2 SERPL-SCNC: 25 MEQ/L (ref 20–31)
COLOR UR: YELLOW
CREAT SERPL-MCNC: 0.94 MG/DL (ref 0.5–0.9)
EOSINOPHIL # BLD: 0.3 K/UL (ref 0–0.7)
EOSINOPHIL NFR BLD: 6.6 %
ERYTHROCYTE [DISTWIDTH] IN BLOOD BY AUTOMATED COUNT: 14.6 % (ref 11.5–14.5)
GLOBULIN SER CALC-MCNC: 2.6 G/DL (ref 2.3–3.5)
GLUCOSE SERPL-MCNC: 116 MG/DL (ref 70–99)
GLUCOSE UR STRIP-MCNC: NEGATIVE MG/DL
HCT VFR BLD AUTO: 40.7 % (ref 37–47)
HGB BLD-MCNC: 13.5 G/DL (ref 12–16)
HGB UR QL STRIP: NEGATIVE
KETONES UR STRIP-MCNC: NEGATIVE MG/DL
LACTIC ACID, SEPSIS: 0.9 MMOL/L (ref 0.5–1.9)
LACTIC ACID, SEPSIS: 1 MMOL/L (ref 0.5–1.9)
LEUKOCYTE ESTERASE UR QL STRIP: NEGATIVE
LYMPHOCYTES # BLD: 1.6 K/UL (ref 1–4.8)
LYMPHOCYTES NFR BLD: 35.9 %
MAGNESIUM SERPL-MCNC: 2.1 MG/DL (ref 1.7–2.4)
MCH RBC QN AUTO: 30.8 PG (ref 27–31.3)
MCHC RBC AUTO-ENTMCNC: 33.2 % (ref 33–37)
MCV RBC AUTO: 92.9 FL (ref 79.4–94.8)
MONOCYTES # BLD: 0.4 K/UL (ref 0.2–0.8)
MONOCYTES NFR BLD: 9.5 %
NEUTROPHILS # BLD: 2.1 K/UL (ref 1.4–6.5)
NEUTS SEG NFR BLD: 46.9 %
NITRITE UR QL STRIP: NEGATIVE
PH UR STRIP: 7 [PH] (ref 5–9)
PLATELET # BLD AUTO: 289 K/UL (ref 130–400)
POTASSIUM SERPL-SCNC: 4.2 MEQ/L (ref 3.4–4.9)
PROCALCITONIN SERPL IA-MCNC: 0.06 NG/ML (ref 0–0.15)
PROT SERPL-MCNC: 6.7 G/DL (ref 6.3–8)
PROT UR STRIP-MCNC: NEGATIVE MG/DL
RBC # BLD AUTO: 4.38 M/UL (ref 4.2–5.4)
SARS-COV-2 RDRP RESP QL NAA+PROBE: NOT DETECTED
SODIUM SERPL-SCNC: 139 MEQ/L (ref 135–144)
SP GR UR STRIP: 1.01 (ref 1–1.03)
TROPONIN, HIGH SENSITIVITY: 21 NG/L (ref 0–19)
TROPONIN, HIGH SENSITIVITY: 22 NG/L (ref 0–19)
URINE REFLEX TO CULTURE: NORMAL
UROBILINOGEN UR STRIP-ACNC: 0.2 E.U./DL
WBC # BLD AUTO: 4.5 K/UL (ref 4.8–10.8)

## 2024-08-19 PROCEDURE — 84145 PROCALCITONIN (PCT): CPT

## 2024-08-19 PROCEDURE — 6370000000 HC RX 637 (ALT 250 FOR IP): Performed by: PHYSICIAN ASSISTANT

## 2024-08-19 PROCEDURE — 83735 ASSAY OF MAGNESIUM: CPT

## 2024-08-19 PROCEDURE — 87635 SARS-COV-2 COVID-19 AMP PRB: CPT

## 2024-08-19 PROCEDURE — 85025 COMPLETE CBC W/AUTO DIFF WBC: CPT

## 2024-08-19 PROCEDURE — 99284 EMERGENCY DEPT VISIT MOD MDM: CPT

## 2024-08-19 PROCEDURE — 83605 ASSAY OF LACTIC ACID: CPT

## 2024-08-19 PROCEDURE — 80053 COMPREHEN METABOLIC PANEL: CPT

## 2024-08-19 PROCEDURE — 93005 ELECTROCARDIOGRAM TRACING: CPT | Performed by: PHYSICIAN ASSISTANT

## 2024-08-19 PROCEDURE — 84484 ASSAY OF TROPONIN QUANT: CPT

## 2024-08-19 PROCEDURE — 81003 URINALYSIS AUTO W/O SCOPE: CPT

## 2024-08-19 PROCEDURE — 36415 COLL VENOUS BLD VENIPUNCTURE: CPT

## 2024-08-19 RX ORDER — ACETAMINOPHEN 500 MG
1000 TABLET ORAL ONCE
Status: COMPLETED | OUTPATIENT
Start: 2024-08-19 | End: 2024-08-19

## 2024-08-19 RX ADMIN — ACETAMINOPHEN 1000 MG: 500 TABLET ORAL at 10:35

## 2024-08-19 ASSESSMENT — LIFESTYLE VARIABLES
HOW OFTEN DO YOU HAVE A DRINK CONTAINING ALCOHOL: NEVER
HOW MANY STANDARD DRINKS CONTAINING ALCOHOL DO YOU HAVE ON A TYPICAL DAY: PATIENT DOES NOT DRINK

## 2024-08-19 ASSESSMENT — PAIN SCALES - GENERAL
PAINLEVEL_OUTOF10: 0
PAINLEVEL_OUTOF10: 4

## 2024-08-19 NOTE — DISCHARGE INSTRUCTIONS
The covid medication I prescribed you, called PAXLOVID can have an small interaction with your blood pressure med (amlodipine), so just monitor your blood pressure for lows. If your blood pressure is low top numbner <110 I would hold off on taking the amlodipine.

## 2024-08-19 NOTE — ED PROVIDER NOTES
[facility] ED  eMERGENCY dEPARTMENTeNCOUnter      Pt Name: Yudi Curtis  MRN: [id number]  Birthdate 1935  Date ofevaluation: 8/19/2024  Provider: Ban Mcwilliams PA-C    CHIEF COMPLAINT       Chief Complaint   Patient presents with    Urinary Frequency     Took atb for this and last taken yesterday   COVID POSITIVE         HISTORY OF PRESENT ILLNESS   (Location/Symptom, Timing/Onset,Context/Setting, Quality, Duration, Modifying Factors, Severity)  Note limiting factors.   Yudi Curtis is a 89 y.o. female who presents to the emergency department concern for uti. Pt was diagnosed with      HPI    NursingNotes were reviewed.    REVIEW OF SYSTEMS    (2-9 systems for level 4, 10 or more for level 5)     Review of Systems    Except as noted above the remainder of the review of systems was reviewed and negative.       PAST MEDICAL HISTORY     Past Medical History:   Diagnosis Date    Diabetes (HCC)     Diabetes (HCC)     GERD (gastroesophageal reflux disease)     Hyperlipidemia     Hypertension     Sleep difficulties          SURGICALHISTORY       Past Surgical History:   Procedure Laterality Date    APPENDECTOMY      BREAST BIOPSY      COLONOSCOPY      COLONOSCOPY N/A 10/8/2020    COLONOSCOPY DIAGNOSTIC performed by Reny García MD at [facility] CENTER    CYSTOSCOPY N/A 9/24/2020    WITH BOTOX BLADDER INJECTIONS (100 UNITS) performed by Tanisha Mayer [place] [facility] [place]    NERVE SURGERY N/A 9/24/2020    INTERSTIM REVISION performed by Tanisha Mayer MD [place] [facility] [place]    TONSILLECTOMY AND ADENOIDECTOMY      WRIST SURGERY Right          CURRENT MEDICATIONS       Discharge Medication List as of 8/19/2024 11:23 AM        CONTINUE these medications which have NOT CHANGED    Details   oxybutynin (DITROPAN) 5 MG tablet Take 5 mg by mouth 2 times dailyHistorical Med      amLODIPine (NORVASC) 5 MG tablet Take 5 mg by mouth dailyHistorical Med      pravastatin (PRAVACHOL) 20 MG tablet Take 20 mg by mouth  nursing note reviewed.   Constitutional:       General: She is not in acute distress.     Appearance: Normal appearance. She is well-developed. She is not diaphoretic.   HENT:      Head: Normocephalic and atraumatic.      Nose: Nose normal.      Mouth/Throat:      Mouth: Mucous membranes are moist.   Eyes:      General: Lids are normal.      Conjunctiva/sclera: Conjunctivae normal.   Cardiovascular:      Rate and Rhythm: Normal rate and regular rhythm.      Pulses: Normal pulses.      Heart sounds: Normal heart sounds.   Pulmonary:      Effort: Pulmonary effort is normal.      Breath sounds: Normal breath sounds.   Abdominal:      General: Bowel sounds are normal.      Palpations: Abdomen is soft.      Tenderness: There is no abdominal tenderness.   Musculoskeletal:         General: Normal range of motion.      Cervical back: Normal range of motion and neck supple.        Back:    Lymphadenopathy:      Cervical: No cervical adenopathy.   Skin:     General: Skin is warm and dry.      Capillary Refill: Capillary refill takes less than 2 seconds.      Findings: No rash.   Neurological:      Mental Status: She is alert and oriented to person, place, and time.   Psychiatric:         Thought Content: Thought content normal.         Judgment: Judgment normal.         RESULTS     EKG: All EKG's are interpreted by the Emergency Department Physician who either signs or Co-signsthis chart in the absence of a cardiologist.    Nsr 67bpm no acute st changes no ectopy     RADIOLOGY:   Non-plain filmimages such as CT, Ultrasound and MRI are read by the radiologist. Plain radiographic images are visualized and preliminarily interpreted by the emergency physician with the below findings:        Interpretation per the Radiologist below, if available at the time ofthis note:    No orders to display         ED BEDSIDE ULTRASOUND:   Performed by ED Physician - none    LABS:  Labs Reviewed   CBC WITH AUTO DIFFERENTIAL - Abnormal; Notable

## 2024-08-19 NOTE — ED TRIAGE NOTES
Pt states that she was diagnosis with UTI   Pt took last atb yesterday and states still has pain in left lower back still  Pt is alert and oriented times 4   Pt was tested for covid due to protocol of assistive living and tested positive on Saturday   Pt is incontinent of urine at baseline

## 2024-08-20 LAB
EKG ATRIAL RATE: 67 BPM
EKG P AXIS: 63 DEGREES
EKG P-R INTERVAL: 164 MS
EKG Q-T INTERVAL: 412 MS
EKG QRS DURATION: 80 MS
EKG QTC CALCULATION (BAZETT): 435 MS
EKG R AXIS: -19 DEGREES
EKG T AXIS: 39 DEGREES
EKG VENTRICULAR RATE: 67 BPM

## 2024-08-20 PROCEDURE — 93010 ELECTROCARDIOGRAM REPORT: CPT | Performed by: INTERNAL MEDICINE

## 2024-08-26 ASSESSMENT — ENCOUNTER SYMPTOMS
PHOTOPHOBIA: 0
VOMITING: 0
SHORTNESS OF BREATH: 0
EYE PAIN: 0
SORE THROAT: 0
BACK PAIN: 1
COUGH: 1
RHINORRHEA: 0
DIARRHEA: 0
NAUSEA: 0
ABDOMINAL PAIN: 0

## (undated) DEVICE — TOWEL,OR,DSP,ST,BLUE,STD,4/PK,20PK/CS: Brand: MEDLINE

## (undated) DEVICE — 1010 S-DRAPE TOWEL DRAPE 10/BX: Brand: STERI-DRAPE™

## (undated) DEVICE — TOTAL TRAY, DB, 100% SILI FOLEY, 16FR 10: Brand: MEDLINE

## (undated) DEVICE — GLOVE ORANGE PI 8 1/2   MSG9085

## (undated) DEVICE — SYRINGE MED 10ML LUERLOCK TIP W/O SFTY DISP

## (undated) DEVICE — SINGLE PORT MANIFOLD: Brand: NEPTUNE 2

## (undated) DEVICE — SNARE ENDOSCP AD L240CM LOOP W10MM SHTH DIA2.4MM RND INSUL

## (undated) DEVICE — Device: Brand: ENDO SMARTCAP

## (undated) DEVICE — DISPOSABLE NEEDLE: Brand: DISPOSABLE NEEDLE

## (undated) DEVICE — PENCIL SMOKE EVAC PUSH BUTTON COATED

## (undated) DEVICE — ELECTRODE PT RET AD L9FT HI MOIST COND ADH HYDRGEL CORDED

## (undated) DEVICE — COUNTER NDL 40 COUNT HLD 70 FOAM BLK ADH W/ MAG

## (undated) DEVICE — HYPODERMIC SAFETY NEEDLE: Brand: MAGELLAN

## (undated) DEVICE — INTENDED FOR TISSUE SEPARATION, AND OTHER PROCEDURES THAT REQUIRE A SHARP SURGICAL BLADE TO PUNCTURE OR CUT.: Brand: BARD-PARKER ® CARBON RIB-BACK BLADES

## (undated) DEVICE — SYRINGE MED 10ML TRNSLUC BRL PLUNG BLK MRK POLYPR CTRL

## (undated) DEVICE — ADAPTER FLSH PMP FLD MGMT GI IRRIG OFP 2 DISPOSABLE

## (undated) DEVICE — TUBE SET 96 MM 64 MM H2O PERISTALTIC STD AUX CHANNEL

## (undated) DEVICE — GLOVE SURG SZ 85 L12IN FNGR THK94MIL TRNSLUC YEL LTX

## (undated) DEVICE — GAUZE,SPONGE,4"X4",16PLY,XRAY,STRL,LF: Brand: MEDLINE

## (undated) DEVICE — TUBING, SUCTION, 1/4" X 10', STRAIGHT: Brand: MEDLINE

## (undated) DEVICE — YANKAUER,BULB TIP,W/O VENT,RIGID,STERILE: Brand: MEDLINE

## (undated) DEVICE — SPONGE,LAP,18"X18",DLX,XR,ST,5/PK,40/PK: Brand: MEDLINE

## (undated) DEVICE — SUTURE VCRL SZ 2-0 L36IN ABSRB UD L36MM CT-1 1/2 CIR J945H

## (undated) DEVICE — PACK,SET UP,DRAPE: Brand: MEDLINE

## (undated) DEVICE — TRAY PREP DRY W/ PREM GLV 2 APPL 6 SPNG 2 UNDPD 1 OVERWRAP

## (undated) DEVICE — ENDO CARRY-ON PROCEDURE KIT: Brand: ENDO CARRY-ON PROCEDURE KIT

## (undated) DEVICE — PACK,LAPAROTOMY,NO GOWNS: Brand: MEDLINE

## (undated) DEVICE — TRAP POLYP BALEEN

## (undated) DEVICE — BRUSH ENDO CLN L90.5IN SHTH DIA1.7MM BRIST DIA5-7MM 2-6MM

## (undated) DEVICE — KIT ARMOR C DRP COLLAPSIBLE AND SELF EXP TOP CVR FOR FLUOROSCOPIC

## (undated) DEVICE — GOWN,AURORA,NONREINFORCED,LARGE: Brand: MEDLINE

## (undated) DEVICE — MARKER SURG SKIN GENTIAN VLT REG TIP W/ 6IN RUL

## (undated) DEVICE — SET,IRRIGATION,CYSTO/TUR: Brand: MEDLINE

## (undated) DEVICE — SUTURE MCRYL SZ 4-0 L27IN ABSRB UD L19MM PS-2 1/2 CIR PRIM Y426H

## (undated) DEVICE — "MAJ-655 FLEXCYSTO NEEDLE SHEATH": Brand: SHEATH SECTION

## (undated) DEVICE — COVER LT HNDL BLU PLAS

## (undated) DEVICE — LITHOTOMY DRAPE WITH FLUID CONTROL POUCH: Brand: CONVERTORS

## (undated) DEVICE — LABEL MED MINI W/ MARKER

## (undated) DEVICE — GOWN,SIRUS,POLYRNF,BRTHSLV,XLN/XL,20/CS: Brand: MEDLINE

## (undated) DEVICE — SYRINGE IRRIG 60ML SFT PLIABLE BLB EZ TO GRP 1 HND USE W/